# Patient Record
Sex: FEMALE | Race: WHITE | NOT HISPANIC OR LATINO | Employment: OTHER | URBAN - METROPOLITAN AREA
[De-identification: names, ages, dates, MRNs, and addresses within clinical notes are randomized per-mention and may not be internally consistent; named-entity substitution may affect disease eponyms.]

---

## 2017-06-07 ENCOUNTER — GENERIC CONVERSION - ENCOUNTER (OUTPATIENT)
Dept: OTHER | Facility: OTHER | Age: 67
End: 2017-06-07

## 2017-06-12 ENCOUNTER — GENERIC CONVERSION - ENCOUNTER (OUTPATIENT)
Dept: OTHER | Facility: OTHER | Age: 67
End: 2017-06-12

## 2017-08-17 ENCOUNTER — ALLSCRIPTS OFFICE VISIT (OUTPATIENT)
Dept: OTHER | Facility: OTHER | Age: 67
End: 2017-08-17

## 2017-08-17 DIAGNOSIS — R74.01 NONSPECIFIC ELEVATION OF LEVELS OF TRANSAMINASE AND LACTIC ACID DEHYDROGENASE (LDH): ICD-10-CM

## 2017-08-17 DIAGNOSIS — R74.02 NONSPECIFIC ELEVATION OF LEVELS OF TRANSAMINASE AND LACTIC ACID DEHYDROGENASE (LDH): ICD-10-CM

## 2017-08-17 DIAGNOSIS — R10.9 ABDOMINAL PAIN: ICD-10-CM

## 2017-08-18 ENCOUNTER — HOSPITAL ENCOUNTER (OUTPATIENT)
Dept: ULTRASOUND IMAGING | Facility: HOSPITAL | Age: 67
Discharge: HOME | End: 2017-08-18
Payer: COMMERCIAL

## 2017-08-18 ENCOUNTER — GENERIC CONVERSION - ENCOUNTER (OUTPATIENT)
Dept: OTHER | Facility: OTHER | Age: 67
End: 2017-08-18

## 2017-08-18 DIAGNOSIS — R74.01 NONSPECIFIC ELEVATION OF LEVELS OF TRANSAMINASE AND LACTIC ACID DEHYDROGENASE (LDH): ICD-10-CM

## 2017-08-18 DIAGNOSIS — R74.02 NONSPECIFIC ELEVATION OF LEVELS OF TRANSAMINASE AND LACTIC ACID DEHYDROGENASE (LDH): ICD-10-CM

## 2017-08-18 PROCEDURE — 76705 ECHO EXAM OF ABDOMEN: CPT

## 2017-08-19 LAB
ALT SERPL W P-5'-P-CCNC: 31 IU/L (ref 0–32)
AST SERPL W P-5'-P-CCNC: 30 IU/L (ref 0–40)
HEPATITIS B SURFACE ANTIBODY (HISTORICAL): 16.2 MIU/ML
HEPATITIS C ANTIBODY (HISTORICAL): <0.1 S/CO RATIO (ref 0–0.9)

## 2017-08-20 LAB — FERRITIN SERPL-MCNC: 145 NG/ML (ref 15–150)

## 2017-12-01 DIAGNOSIS — R92.8 OTHER ABNORMAL AND INCONCLUSIVE FINDINGS ON DIAGNOSTIC IMAGING OF BREAST: ICD-10-CM

## 2017-12-26 ENCOUNTER — GENERIC CONVERSION - ENCOUNTER (OUTPATIENT)
Dept: INTERNAL MEDICINE CLINIC | Facility: CLINIC | Age: 67
End: 2017-12-26

## 2018-01-11 NOTE — PROGRESS NOTES
Assessment    1  History of Oral Surgery Tooth Extraction   2  History of Neuroplasty Decompression Median Nerve At Carpal Tunnel   3  History of Cataract Surgery   4  Need for prophylactic vaccination and inoculation against varicella (V05 4) (Z23)   5  Encounter for preventive health examination (V70 0) (Z00 00)    Plan  Health Maintenance    · 3 - Hoang PINON, Torri Galvin  (Gastroenterology) Physician Referral  Consult  Status: Hold For -  Scheduling  Requested for: [id number]  are Referring to a non- Preferred Provider : Established Patient  Care Summary provided  : Yes  Hyperlipidemia    · Vytorin 10-20 MG Oral Tablet; TAKE 1 TABLET DAILY AS DIRECTED  Need for prophylactic vaccination and inoculation against varicella    · Zoster (Zostavax)    Discussion/Summary  health maintenance visit Currently, she eats an adequate diet and has an inadequate exercise regimen  the risks and benefits of cervical cancer screening were discussed cervical cancer screening is managed by [facility] Breast cancer screening: the risks and benefits of breast cancer screening were discussed  Colorectal cancer screening: the risks and benefits of colorectal cancer screening were discussed and Referred back to Dr Danica Sidhu  The risks and benefits of immunizations were discussed and immunizations are needed  Advice and education were given regarding nutrition and aerobic exercise  Patient discussion: discussed with the patient  Ramana Rodriguez was seen and examined in the office today  Examination reveals +S1/S2, regular rate and rhythm without murmurs or gallops  Pulmonary and abdominal examination are unremarkable  She does have a noted corn of the foot and will be seeing [facility] on Friday  She does have to schedule a routine follow up with her Ob-gyn as it has been some time  She was referred to her previous gastroenterologist as she is overdue for her Cscope     In terms of her hyperlipidemia, she will remain on the Vytorin and does have blood work to repeat in a few weeks time  She was given Zostavax today and we discussed Prevnar to be given at the next visit  Otherwise no other changes were made  The patient was counseled regarding instructions for management  Chief Complaint  Annual physical      History of Present Illness  HM, Adult Female: The patient is being seen for a health maintenance evaluation  The last health maintenance visit was 2 year(s) ago  Social History: Household members include spouse  Work status: working full time and occupation: Labarotary technician  The patient has never smoked cigarettes  She reports rare alcohol use  She has never used illicit drugs  General Health: The patient's health since the last visit is described as fair  She has regular dental visits  She denies vision problems  She denies hearing loss  Immunizations status: not up to date  Lifestyle:  She consumes a diverse and healthy diet  She has weight concerns  She does not exercise regularly  She does not use tobacco  She consumes alcohol  She denies drug use  Reproductive health: the patient is postmenopausal    Screening:   HPI: Salome Resendiz comes to the office today for a routine physical  She reports generally feeling well and denies any significant complaints  Recently, she was diagnosed with hyperlipidemia after completing blood work at work and was started on Simvastatin  She reported not feeling well on this and a physician at her work switched her to [employer]  She reports tolerating this  She otherwise denies any other complaints  Review of Systems    Constitutional: no fever, not feeling poorly, no recent weight gain, no chills, not feeling tired and no recent weight loss  Eyes: no eye pain, no eyesight problems, no dryness of the eyes, eyes not red, no purulent discharge from the eyes and no itching of the eyes  ENT: no earache, no sore throat, no nasal discharge and no hoarseness     Cardiovascular: palpitations, but no chest pain, no intermittent leg claudication and the heart rate was not fast    Respiratory: no shortness of breath, no cough, no orthopnea, no shortness of breath during exertion and no PND  Gastrointestinal: no abdominal pain, no nausea, no vomiting, no constipation, no diarrhea and no blood in stools  Genitourinary: no dysuria, no pelvic pain and no unexplained vaginal bleeding  Musculoskeletal: no arthralgias, no joint swelling, no myalgias and no joint stiffness  Integumentary: no rashes, no itching, no breast pain, no skin lesions and no breast lump  Neurological: no headache, no numbness, no tingling and no dizziness  Psychiatric: not suicidal, no anxiety, no sleep disturbances and no depression  Endocrine: no hot flashes  Hematologic/Lymphatic: no swollen glands, no tendency for easy bleeding and no tendency for easy bruising  ROS reviewed  Active Problems    1  Dependent edema (782 3) (R60 9)   2  Hyperlipidemia (272 4) (E78 5)   3  Radial tunnel syndrome (354 3) (G56 30)   4  Venous insufficiency (459 81) (I87 2)    Past Medical History    · History of Post-viral reactive airway disease (493 10) (J45 998)    Surgical History    · History of Cataract Surgery   · History of Neuroplasty Decompression Median Nerve At Carpal Tunnel   · History of Oral Surgery Tooth Extraction    Family History    · Family history of Diabetes Mellitus (V18 0)    Social History    · Never a smoker    Current Meds   1  Aspirin 81 MG Oral Tablet; Therapy: 28Jhs5149 to Recorded   2  Fish Oil Maximum Strength 1200 MG Oral Capsule Delayed Release; TAKE 2 CAPSULE   Daily; Therapy: 18VNO9211 to (Evaluate:14Jan2015); Last Rx:[id number] Ordered   3  Multi-Vitamins Oral Tablet; TAKE 1 TABLET DAILY; Therapy: 76UPH4054 to (Evaluate:34Mhv5480); Last Rx:[id number] Ordered   4  Vitamin B-12 ER 1000 MCG Oral Tablet Extended Release; TAKE 1 TABLET DAILY AS   DIRECTED; Therapy: 94AOL2037 to (Evaluate:17Aug2014);  Last Rx: [id number] Ordered   5  Vitamin D3 2000 UNIT Oral Capsule; take 1 capsule daily; Therapy: 22TID3916 to (Last Rx:[id number]) Ordered   6  Vitamin E 100 UNIT Oral Capsule; Therapy: 48Quo7525 to Recorded   7  Vytorin 10-20 MG Oral Tablet; TAKE 1 TABLET DAILY AS DIRECTED; Therapy: 21Mar2016 to Recorded    Allergies    1  Neosporin OINT    Vitals   Recorded: 21Mar2016 02:32PM   Heart Rate 70   Systolic 922   Diastolic 60   Height 5 ft 3 in   Weight 178 lb 6 08 oz   BMI Calculated 31 6   BSA Calculated 1 85   O2 Saturation 96     Physical Exam    Constitutional   General appearance: No acute distress, well appearing and well nourished  Head and Face   Head and face: Normal     Palpation of the face and sinuses: No sinus tenderness  Eyes   Conjunctiva and lids: No swelling, erythema or discharge  Pupils and irises: Equal, round, reactive to light  Ears, Nose, Mouth, and Throat   External inspection of ears and nose: Normal     Otoscopic examination: Abnormal   Cerumen present bilaterally  Nasal mucosa, septum, and turbinates: Normal without edema or erythema  Lips, teeth, and gums: Normal, good dentition  Oropharynx: Normal with no erythema, edema, exudate or lesions  Neck   Neck: Supple, symmetric, trachea midline, no masses  Thyroid: Normal, no thyromegaly  Pulmonary   Respiratory effort: No increased work of breathing or signs of respiratory distress  Auscultation of lungs: Clear to auscultation  Cardiovascular   Palpation of heart: Normal PMI, no thrills  Auscultation of heart: Normal rate and rhythm, normal S1 and S2, no murmurs  Carotid pulses: 2+ bilaterally  Pedal pulses: 2+ bilaterally  Examination of extremities for edema and/or varicosities: Normal     Abdomen   Abdomen: Non-tender, no masses  Liver and spleen: No hepatomegaly or splenomegaly  Examination for hernias: No hernia appreciated      Lymphatic   Palpation of lymph nodes in neck: No lymphadenopathy  Musculoskeletal   Gait and station: Normal     Digits and nails: Normal without clubbing or cyanosis  Joints, bones, and muscles: Normal     Range of motion: Normal     Stability: Normal     Muscle strength/tone: Normal     Skin   Skin and subcutaneous tissue: Normal without rashes or lesions  Corn noted on left foot  Neurologic   Cranial nerves: Cranial nerves II-XII intact  Cortical function: Normal mental status  Sensation: No sensory loss  Psychiatric   Judgment and insight: Normal     Orientation to person, place, and time: Normal     Recent and remote memory: Intact      Mood and affect: Normal        Signatures   Electronically signed by : Mikala Carr DO; Mar 21 2016  4:57PM EST                       (Author)

## 2018-01-13 VITALS
HEIGHT: 63 IN | DIASTOLIC BLOOD PRESSURE: 76 MMHG | BODY MASS INDEX: 28.17 KG/M2 | SYSTOLIC BLOOD PRESSURE: 130 MMHG | WEIGHT: 159 LBS

## 2018-05-25 ENCOUNTER — OFFICE VISIT (OUTPATIENT)
Dept: FAMILY MEDICINE CLINIC | Facility: CLINIC | Age: 68
End: 2018-05-25
Payer: COMMERCIAL

## 2018-05-25 VITALS
DIASTOLIC BLOOD PRESSURE: 70 MMHG | SYSTOLIC BLOOD PRESSURE: 116 MMHG | TEMPERATURE: 97.8 F | OXYGEN SATURATION: 97 % | HEART RATE: 64 BPM

## 2018-05-25 DIAGNOSIS — R68.89 FLU-LIKE SYMPTOMS: Primary | ICD-10-CM

## 2018-05-25 DIAGNOSIS — E66.9 OBESITY, UNSPECIFIED CLASSIFICATION, UNSPECIFIED OBESITY TYPE, UNSPECIFIED WHETHER SERIOUS COMORBIDITY PRESENT: ICD-10-CM

## 2018-05-25 PROBLEM — R74.01 ELEVATED AST (SGOT): Status: ACTIVE | Noted: 2017-08-17

## 2018-05-25 PROBLEM — R74.01 ELEVATED ALT MEASUREMENT: Status: ACTIVE | Noted: 2017-08-17

## 2018-05-25 PROBLEM — R92.8 ABNORMAL FINDINGS ON DIAGNOSTIC IMAGING OF BREAST: Status: ACTIVE | Noted: 2017-06-15

## 2018-05-25 PROBLEM — R10.9 ABDOMINAL PAIN: Status: ACTIVE | Noted: 2017-08-17

## 2018-05-25 PROCEDURE — 99213 OFFICE O/P EST LOW 20 MIN: CPT | Performed by: PHYSICIAN ASSISTANT

## 2018-05-25 RX ORDER — BENZONATATE 200 MG/1
200 CAPSULE ORAL 3 TIMES DAILY PRN
COMMUNITY
End: 2018-11-08 | Stop reason: ALTCHOICE

## 2018-05-25 RX ORDER — AZITHROMYCIN 250 MG/1
500 TABLET, FILM COATED ORAL EVERY 24 HOURS
COMMUNITY
End: 2018-11-08 | Stop reason: ALTCHOICE

## 2018-05-25 RX ORDER — FOLIC ACID/MULTIVIT,IRON,MINER 0.4MG-18MG
2 TABLET ORAL DAILY
COMMUNITY
Start: 2014-07-18

## 2018-05-25 RX ORDER — PROMETHAZINE HYDROCHLORIDE AND CODEINE PHOSPHATE 6.25; 1 MG/5ML; MG/5ML
5 SYRUP ORAL EVERY 4 HOURS PRN
Qty: 120 ML | Refills: 0 | Status: SHIPPED | OUTPATIENT
Start: 2018-05-25 | End: 2018-11-08 | Stop reason: ALTCHOICE

## 2018-05-25 RX ORDER — EZETIMIBE AND SIMVASTATIN 10; 40 MG/1; MG/1
TABLET ORAL DAILY
COMMUNITY
Start: 2018-03-29 | End: 2019-12-20 | Stop reason: SDUPTHER

## 2018-05-25 NOTE — ASSESSMENT & PLAN NOTE
The patient wished to have assistance with her weight  She refused to have her weight checked today but was interested in following up with weight management  The patient was referred to weight management (nonsurgical) as above

## 2018-05-25 NOTE — LETTER
May 25, 2018     Patient: Jerry Bowman   YOB: 1950   Date of Visit: 5/25/2018       To Whom it May Concern:    Jerry Bowman is under my professional care  She was seen in my office on 5/25/2018  She may return to work on 5/29/18  Please excuse her absence from 5/22/18 through 5/25/18  If you have any questions or concerns, please don't hesitate to call           Sincerely,          Ellen Miguel PA-C        CC: No Recipients

## 2018-05-25 NOTE — PROGRESS NOTES
[facility]  FAMILY PRACTICE ACUTE OFFICE VISIT       NAME: Radha Fox  AGE: 79 y o  SEX: female       : 1950        MRN: [id number]    DATE: 2018  TIME: 12:22 PM    Assessment and Plan     Problem List Items Addressed This Visit     Obesity       The patient wished to have assistance with her weight  She refused to have her weight checked today but was interested in following up with weight management  The patient was referred to weight management (nonsurgical) as above  Relevant Orders    Ambulatory referral to Weight Management      Other Visit Diagnoses     Flu-like symptoms    -  Primary    Relevant Medications    promethazine-codeine (PHENERGAN WITH CODEINE) 6 25-10 mg/5 mL syrup          Obesity    The patient wished to have assistance with her weight  She refused to have her weight checked today but was interested in following up with weight management  The patient was referred to weight management (nonsurgical) as above  Patient Instructions     I reviewed with the patient that her symptoms appeared consistent with the flu  She has had her symptoms for the last 4-5 days and she does seem to be starting to improve  She is not getting adequate relief from the benzonatate that she already has so she was given a script for promethazine with codeine cough syrup to use as needed  She was directed not to take this prior to driving or any other activities where she should not be drowsy  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  She reports that she plans to finish taking it though  She was encouraged to call for symptoms worsen or fail to improve; however, she was reassured that she should be better in time to return to work next Tuesday    She was given a work note to excuse her absence this week  Chief Complaint     Chief Complaint   Patient presents with    URI     coughing and sore thorat for 5 days        History of Present Illness   Dany Grijalva is a 79y o -year-old female who presents for ongoing cough  Cough   This is a new (improving except for cough and voice change - did have flu shot this year) problem  Episode onset: about 4 days ago  The cough is non-productive  Associated symptoms include chills, myalgias (improving - helped by ibuprofen 800 mg ), rhinorrhea (blowing nose more than normal  ) and sweats  Pertinent negatives include no ear pain, fever, headaches, postnasal drip, sore throat, shortness of breath or wheezing  Nothing aggravates the symptoms  Risk factors: works in [employer]  Treatments tried: Dagoberto Louder and [address]  The treatment provided no relief  Her past medical history is significant for environmental allergies (notes it is usually in April)  There is no history of asthma, bronchitis, COPD or pneumonia  Review of Systems   Review of Systems   Constitutional: Positive for chills and fatigue  Negative for fever  HENT: Positive for rhinorrhea (blowing nose more than normal  ), sneezing and voice change  Negative for congestion, ear pain, postnasal drip, sinus pressure and sore throat  Respiratory: Positive for cough (dry)  Negative for chest tightness, shortness of breath and wheezing  Gastrointestinal: Negative for diarrhea, nausea and vomiting  Musculoskeletal: Positive for myalgias (improving - helped by ibuprofen 800 mg )  Allergic/Immunologic: Positive for environmental allergies (notes it is usually in April)  Neurological: Negative for dizziness and headaches         Active Problem List     Patient Active Problem List   Diagnosis    Abdominal pain    Abnormal findings on diagnostic imaging of breast    Dependent edema    Elevated ALT measurement    Elevated AST (SGOT)    Hyperlipidemia    Radial tunnel syndrome    Venous insufficiency    Obesity         Social History:  Social History     Social History    Marital status: /Civil Union     Spouse name: N/A    Number of children: N/A    Years of education: N/A     Occupational History    Not on file  Social History Main Topics    Smoking status: Never Smoker    Smokeless tobacco: Never Used    Alcohol use Yes      Comment: rarely     Drug use: No    Sexual activity: Not on file     Other Topics Concern    Not on file     Social History Narrative    No narrative on file       Objective     Vitals:    05/25/18 1056   BP: 116/70   Pulse: 64   Temp: 97 8 °F (36 6 °C)   SpO2: 97%     Wt Readings from Last 3 Encounters:   08/17/17 72 1 kg (159 lb)   07/11/16 72 1 kg (159 lb)   03/21/16 80 9 kg (178 lb 6 1 oz)       Physical Exam   Constitutional: She appears well-developed and well-nourished  No distress  HENT:   Head: Normocephalic and atraumatic  Right Ear: External ear normal    Left Ear: External ear normal    Nose: Nose normal    Mouth/Throat: Oropharynx is clear and moist    Neck: Neck supple  No thyromegaly present  Cardiovascular: Normal rate, regular rhythm, normal heart sounds and intact distal pulses  No murmur heard  Pulmonary/Chest: Effort normal and breath sounds normal  She has no wheezes  She has no rales  Lymphadenopathy:     She has no cervical adenopathy  Skin: Skin is warm and dry  Psychiatric: She has a normal mood and affect  Her behavior is normal    Vitals reviewed          ALLERGIES:  Allergies   Allergen Reactions    Other Hives     Apples, pears, peaches     Pollen Extract     Neomycin-Bacitracin Zn-Polymyx Rash       Current Medications     Current Outpatient Prescriptions   Medication Sig Dispense Refill    aspirin 81 MG tablet Take 81 mg by mouth daily        azithromycin (ZITHROMAX) 250 mg tablet Take 500 mg by mouth every 24 hours      benzonatate (TESSALON) 200 MG capsule Take 200 mg by mouth 3 (three) times a day as needed for cough      Cholecalciferol (EQL VITAMIN D3) 2000 units CAPS Take 1 capsule by mouth daily      Cyanocobalamin ER (TH VITAMIN B12 ER) 1000 MCG TBCR Take 1 tablet by mouth daily      ezetimibe-simvastatin (VYTORIN) 10-40 mg per tablet Take by mouth daily      Multiple Vitamin (MULTI-VITAMINS PO) Take 1 tablet by mouth daily      Omega-3 Fatty Acids (FISH OIL MAXIMUM STRENGTH) 1200 MG CPDR Take 2 capsules by mouth daily      vitamin E 100 UNIT capsule Take 100 Units by mouth daily        promethazine-codeine (PHENERGAN WITH CODEINE) 6 25-10 mg/5 mL syrup Take 5 mL by mouth every 4 (four) hours as needed for cough 120 mL 0     No current facility-administered medications for this visit            Delaney Lopez PA-C  5/25/2018 12:22 PM  [facility]

## 2018-05-25 NOTE — PATIENT INSTRUCTIONS
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

## 2018-06-01 DIAGNOSIS — R92.8 OTHER ABNORMAL AND INCONCLUSIVE FINDINGS ON DIAGNOSTIC IMAGING OF BREAST: ICD-10-CM

## 2018-08-09 ENCOUNTER — TELEPHONE (OUTPATIENT)
Dept: INTERNAL MEDICINE CLINIC | Facility: CLINIC | Age: 68
End: 2018-08-09

## 2018-08-09 DIAGNOSIS — N64.4 BREAST PAIN: Primary | ICD-10-CM

## 2018-08-09 NOTE — TELEPHONE ENCOUNTER
Pt called and needs a mammogram script faxed to [facility]  She is having breast pain  Her mammogram is scheduled for this Monday 8/13  Please fax this to [phone]

## 2018-11-06 ENCOUNTER — TELEPHONE (OUTPATIENT)
Dept: INTERNAL MEDICINE CLINIC | Facility: CLINIC | Age: 68
End: 2018-11-06

## 2018-11-06 NOTE — TELEPHONE ENCOUNTER
Waiting until 11/8 is very reasonable   These readings are not in the range for a hypertensive urgency and may be reactive and normal

## 2018-11-06 NOTE — TELEPHONE ENCOUNTER
PT BP is elevated 143/90 and 150/86 for past 12 hrs  I suggested dr Jenny Reeder or Urgent care which she declined  We also made an appt on 11/8 with you  Do you have any advise until that time

## 2018-11-08 ENCOUNTER — OFFICE VISIT (OUTPATIENT)
Dept: INTERNAL MEDICINE CLINIC | Facility: CLINIC | Age: 68
End: 2018-11-08
Payer: COMMERCIAL

## 2018-11-08 VITALS
HEIGHT: 63 IN | SYSTOLIC BLOOD PRESSURE: 142 MMHG | HEART RATE: 73 BPM | DIASTOLIC BLOOD PRESSURE: 78 MMHG | OXYGEN SATURATION: 89 % | WEIGHT: 175.6 LBS | BODY MASS INDEX: 31.11 KG/M2 | TEMPERATURE: 97.7 F

## 2018-11-08 DIAGNOSIS — D05.12 DUCTAL CARCINOMA IN SITU (DCIS) OF LEFT BREAST: ICD-10-CM

## 2018-11-08 DIAGNOSIS — R03.0 BLOOD PRESSURE ELEVATED WITHOUT HISTORY OF HTN: Primary | ICD-10-CM

## 2018-11-08 PROBLEM — R10.9 ABDOMINAL PAIN: Status: RESOLVED | Noted: 2017-08-17 | Resolved: 2018-11-08

## 2018-11-08 PROBLEM — R92.8 ABNORMAL FINDINGS ON DIAGNOSTIC IMAGING OF BREAST: Status: RESOLVED | Noted: 2017-06-15 | Resolved: 2018-11-08

## 2018-11-08 PROBLEM — R74.01 ELEVATED AST (SGOT): Status: RESOLVED | Noted: 2017-08-17 | Resolved: 2018-11-08

## 2018-11-08 PROBLEM — R74.01 ELEVATED ALT MEASUREMENT: Status: RESOLVED | Noted: 2017-08-17 | Resolved: 2018-11-08

## 2018-11-08 PROCEDURE — 99214 OFFICE O/P EST MOD 30 MIN: CPT | Performed by: INTERNAL MEDICINE

## 2018-11-08 PROCEDURE — 3008F BODY MASS INDEX DOCD: CPT | Performed by: INTERNAL MEDICINE

## 2018-11-08 RX ORDER — NICOTINE POLACRILEX 2 MG
GUM BUCCAL
COMMUNITY

## 2018-11-08 NOTE — PROGRESS NOTES
Assessment/Plan   Problem List Items Addressed This Visit     Ductal carcinoma in situ (DCIS) of left breast      Other Visit Diagnoses     Blood pressure elevated without history of HTN    -  Primary      Elevated blood pressure:  Repeat blood pressure was 128/74, after sitting and having counseling for 20 minutes  Plan is to not check blood pressures at home as this seems to be causing anticipatory elevated blood pressure readings  We discussed the likelihood that she does not have hypertension instead has reactive elevation of blood pressure due to situational stress and take her blood pressure too often  Reassess in 2 weeks at next visit  Ductal carcinoma in situ with a recent transfer of care to [facility], after October 1st left partial mastectomy, subsequent second revision, with active staging occurring at [facility], for follow-up on November 15th with both Medical Oncology and Radiation Oncology  Joe Salmon is quite emotional discussing all this was quite understandable  She does have good support from her family and good plan going forward is very knowledgeable as 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   We discussed the likelihood that she will need post radiation therapy and chemotherapy, and she is prepared to start these treatments  Subjective   Patient ID: Marck Anglin is a 76 y o  female  She made appointment today to discuss her recent diagnosis of breast cancer, and I did have available all the records from both [facility] and [facility]  We also discussed that her blood pressures have been elevated when she has checked them recently  Vitals:    11/08/18 1013   BP: 142/78   Pulse: 73   Temp: 97 7 °F (36 5 °C)   SpO2: (!) 89%     HPI   Joe Salmon does not have a history of hypertension and her September 55PI basic metabolic profile was normal including GFR 61, glucose 104, calcium 9 11    Joe Salmon has been under lot of stress recently with recent diagnosis of breast cancer  Initial diagnosis is made after abnormal mammogram in August, in follow-up to 2 previous mammograms in the last year that were suspicious  Subsequent biopsy showed ductal carcinoma in situ with October 1st surgery showing cancer to margins and then repeat surgery, with local lymph node dissection performed at [facility], showing no lymph node involvement  She will be seeing medical Oncology and Radiation Oncology next week in follow-up at [facility] which is where she has chosen to continue her treatment  She has little or no pain after her surgeries, and her main issue is her concerned about her diagnosis and prognosis  She had a good discussion with her medical oncologist at [facility] understanding that planned treatment should be curative  Understandably, she is very emotional we talked about how she is coping with this today  Her blood pressures have been up at times at office visits in Oncology and she is taking her blood pressures at home and does have a medical background she works as a technician in the pathology office  Blood pressures have been high, without headache, chest pain or shortness of breath or history of hypertension  Reviewed her September 12th normal basic metabolic profile  At that same time, CBC was normal     Also reviewed with the notes from her abnormal mammogram, biopsy results, surgery of October 1st and subsequent procedures and follow-up at [facility] after transferring her care  The following portions of the patient's history were reviewed and updated as appropriate: allergies, current medications, past family history, past medical history, past social history, past surgical history and problem list     Review of Systems    Objective   Physical Exam   Vital signs stable, tearful discussing her situation but did feel better after discussion today    Repeat blood pressure was normal at 128/74, right arm sitting  Lungs clear cardiac exam is normal   She showed me her surgical sites which are healing well          Patient Active Problem List   Diagnosis    Dependent edema    Radial tunnel syndrome    Venous insufficiency    Obesity    Ductal carcinoma in situ (DCIS) of left breast     Current Outpatient Prescriptions:     aspirin 81 MG tablet, Take 81 mg by mouth daily  , Disp: , Rfl:     Biotin 1 MG CAPS, Take by mouth, Disp: , Rfl:     Cholecalciferol (EQL VITAMIN D3) 2000 units CAPS, Take 1 capsule by mouth daily, Disp: , Rfl:     Cyanocobalamin ER (TH VITAMIN B12 ER) 1000 MCG TBCR, Take 1 tablet by mouth daily, Disp: , Rfl:     ezetimibe 10 mg TABS 10 mg, atorvastatin 40 mg TABS 40 mg, Take by mouth daily, Disp: , Rfl:     ezetimibe-simvastatin (VYTORIN) 10-40 mg per tablet, Take by mouth daily, Disp: , Rfl:     Multiple Vitamin (MULTI-VITAMINS PO), Take 1 tablet by mouth daily, Disp: , Rfl:     Omega-3 Fatty Acids (FISH OIL MAXIMUM STRENGTH) 1200 MG CPDR, Take 2 capsules by mouth daily, Disp: , Rfl:     vitamin E 100 UNIT capsule, Take 100 Units by mouth daily  , Disp: , Rfl:

## 2018-11-19 ENCOUNTER — OFFICE VISIT (OUTPATIENT)
Dept: INTERNAL MEDICINE CLINIC | Facility: CLINIC | Age: 68
End: 2018-11-19
Payer: COMMERCIAL

## 2018-11-19 VITALS
HEART RATE: 79 BPM | HEIGHT: 63 IN | SYSTOLIC BLOOD PRESSURE: 124 MMHG | DIASTOLIC BLOOD PRESSURE: 74 MMHG | OXYGEN SATURATION: 99 % | BODY MASS INDEX: 31.11 KG/M2 | TEMPERATURE: 98.5 F

## 2018-11-19 DIAGNOSIS — F43.9 SITUATIONAL STRESS: Primary | ICD-10-CM

## 2018-11-19 DIAGNOSIS — D05.12 DUCTAL CARCINOMA IN SITU (DCIS) OF LEFT BREAST: ICD-10-CM

## 2018-11-19 PROBLEM — M47.817 LUMBOSACRAL SPONDYLOSIS WITHOUT MYELOPATHY: Status: ACTIVE | Noted: 2018-11-19

## 2018-11-19 PROCEDURE — 1160F RVW MEDS BY RX/DR IN RCRD: CPT | Performed by: INTERNAL MEDICINE

## 2018-11-19 PROCEDURE — 1036F TOBACCO NON-USER: CPT | Performed by: INTERNAL MEDICINE

## 2018-11-19 PROCEDURE — 99213 OFFICE O/P EST LOW 20 MIN: CPT | Performed by: INTERNAL MEDICINE

## 2018-11-19 NOTE — PROGRESS NOTES
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   She will have her port placed the same day the chemotherapy starts, which will be 1 week from today, at a satellite campus of [facility], located in [facility]  Blood pressure is now normal   Social supports are excellent and her attitude is excellent  We discussed I will follow her progress actively through communication I received from [facility]  We also discussed that for any general medical issues, or issues I can help with locally, I will be available  Subjective   Patient ID: Kavita Dixon is a 76 y o  female, here in follow-up regarding situational stress and elevation of blood pressure last week  Vitals:    11/19/18 1517   BP: 124/74   Pulse:    Temp:    SpO2:      HPI   Tara Mayorga is doing much better today as she has gotten a lot of great support from [facility] where she will be receiving her chemotherapy treatment starting next week  Also, her family, friends, coworkers have been very supportive  She very good idea what to expect with her treatment and should do very well  She sleeping better, much less anxious  We discussed her upcoming chemotherapy protocol, and expected side effects and benefits of treatment  She had her 2D echocardiogram today for surveillance prior to chemotherapy and results are pending      The following portions of the patient's history were reviewed and updated as appropriate: allergies, current medications, past family history, past medical history, past social history, past surgical history and problem list     Review of Systems    Objective   Physical Exam   Vital signs stable, pulse regular and repeat blood pressure is 124/74 right arm sitting  Anxiety present last visit has resolved  Attitude is excellent            Patient Active Problem List   Diagnosis    Dependent edema    Radial tunnel syndrome    Venous insufficiency    Obesity    Ductal carcinoma in situ (DCIS) of left breast    Lumbosacral spondylosis without myelopathy     Current Outpatient Prescriptions:     aspirin 81 MG tablet, Take 81 mg by mouth daily  , Disp: , Rfl:     Biotin 1 MG CAPS, Take by mouth, Disp: , Rfl:     Cholecalciferol (EQL VITAMIN D3) 2000 units CAPS, Take 1 capsule by mouth daily, Disp: , Rfl:     Cyanocobalamin ER (TH VITAMIN B12 ER) 1000 MCG TBCR, Take 1 tablet by mouth daily, Disp: , Rfl:     ezetimibe 10 mg TABS 10 mg, atorvastatin 40 mg TABS 40 mg, Take by mouth daily, Disp: , Rfl:     ezetimibe-simvastatin (VYTORIN) 10-40 mg per tablet, Take by mouth daily, Disp: , Rfl:     Multiple Vitamin (MULTI-VITAMINS PO), Take 1 tablet by mouth daily, Disp: , Rfl:     Omega-3 Fatty Acids (FISH OIL MAXIMUM STRENGTH) 1200 MG CPDR, Take 2 capsules by mouth daily, Disp: , Rfl:     vitamin E 100 UNIT capsule, Take 100 Units by mouth daily  , Disp: , Rfl:

## 2019-01-17 ENCOUNTER — TELEPHONE (OUTPATIENT)
Dept: INTERNAL MEDICINE CLINIC | Facility: CLINIC | Age: 69
End: 2019-01-17

## 2019-01-17 ENCOUNTER — OFFICE VISIT (OUTPATIENT)
Dept: INTERNAL MEDICINE CLINIC | Facility: CLINIC | Age: 69
End: 2019-01-17
Payer: COMMERCIAL

## 2019-01-17 VITALS
TEMPERATURE: 99.8 F | OXYGEN SATURATION: 98 % | WEIGHT: 167 LBS | SYSTOLIC BLOOD PRESSURE: 120 MMHG | HEART RATE: 120 BPM | DIASTOLIC BLOOD PRESSURE: 70 MMHG | BODY MASS INDEX: 29.59 KG/M2 | HEIGHT: 63 IN

## 2019-01-17 DIAGNOSIS — J02.9 ST (SORE THROAT): Primary | ICD-10-CM

## 2019-01-17 LAB — S PYO AG THROAT QL: POSITIVE

## 2019-01-17 PROCEDURE — 1036F TOBACCO NON-USER: CPT | Performed by: INTERNAL MEDICINE

## 2019-01-17 PROCEDURE — 99213 OFFICE O/P EST LOW 20 MIN: CPT | Performed by: INTERNAL MEDICINE

## 2019-01-17 PROCEDURE — 1160F RVW MEDS BY RX/DR IN RCRD: CPT | Performed by: INTERNAL MEDICINE

## 2019-01-17 PROCEDURE — 87880 STREP A ASSAY W/OPTIC: CPT | Performed by: INTERNAL MEDICINE

## 2019-01-17 PROCEDURE — 3008F BODY MASS INDEX DOCD: CPT | Performed by: INTERNAL MEDICINE

## 2019-01-17 RX ORDER — AZITHROMYCIN 500 MG/1
500 TABLET, FILM COATED ORAL EVERY 24 HOURS
Qty: 4 TABLET | Refills: 1 | Status: SHIPPED | OUTPATIENT
Start: 2019-01-17 | End: 2019-01-21

## 2019-01-17 NOTE — PROGRESS NOTES
Assessment/Plan:    No problem-specific Assessment & Plan notes found for this encounter  Diagnoses and all orders for this visit:    ST (sore throat)          Subjective:      Patient ID: Yenni López is a 76 y o  female  DAWSON Fox i she has significant throat pain s here today with a sore throat that she has had for couple of days since I saw her the last time she has developed breast cancer under 1 surgical procedure will be facing radiation and is initiated chemotherapy this has been done to the auspices of [facility] in [place]  She is got through her surgery and is proceeding with the chemotherapy with great courage she has significant throat pain that is aggravated by swallowing she has she is aware that she has a low-grade fever but does not feel septic or toxic    The following portions of the patient's history were reviewed and updated as appropriate: allergies, current medications, past family history, past social history, past surgical history and problem list     Review of Systems      Objective:      /70   Pulse (!) 120   Temp 99 8 °F (37 7 °C) (Tympanic)   Ht 5' 3" (1 6 m)   Wt 75 8 kg (167 lb)   SpO2 98%   BMI 29 58 kg/m²          Physical Exam  she appears well in no distress she has significant alopecia and is wearing and Azell Shiva had her blood pressure is 120/70 the pulses in the 70s and is quite unremarkable tympanic pressure was 99 8   a the left ty  She calls if there are any problems  mpanic canal is largely occluded with cerumen the visible part of the drum is unremarkable other right tympanic membrane is normal the pharynx actually looks quite benign it is is a little bit of erythema there is no pus not much in the way of air induration adenopathy is not noted  A rapid strep is negative    I believe she most probably has a viral URI will give her prescription for some Zithromax 500 mg in case a after day or to the symptoms do not resolve taking into consideration that the rapid strep test is not 100% reliable she will let the her physicians at [facility] know about this visit

## 2019-02-07 DIAGNOSIS — M79.604 BILATERAL LEG PAIN: Primary | ICD-10-CM

## 2019-02-07 DIAGNOSIS — M79.605 BILATERAL LEG PAIN: Primary | ICD-10-CM

## 2019-02-07 NOTE — PROGRESS NOTES
Pt was advised to get a b/l venous doppler due to leg pain  She is currently having problems sleeping as well  Can she have something to help her sleep, she has tried otc remedies

## 2019-02-08 ENCOUNTER — HOSPITAL ENCOUNTER (OUTPATIENT)
Dept: NON INVASIVE DIAGNOSTICS | Facility: CLINIC | Age: 69
Discharge: HOME | End: 2019-02-08
Payer: COMMERCIAL

## 2019-02-08 DIAGNOSIS — M79.604 BILATERAL LEG PAIN: ICD-10-CM

## 2019-02-08 DIAGNOSIS — M79.605 BILATERAL LEG PAIN: ICD-10-CM

## 2019-02-08 PROCEDURE — 93970 EXTREMITY STUDY: CPT

## 2019-02-08 PROCEDURE — 93970 EXTREMITY STUDY: CPT | Performed by: SURGERY

## 2019-12-19 ENCOUNTER — ANESTHESIA EVENT (OUTPATIENT)
Dept: GASTROENTEROLOGY | Facility: HOSPITAL | Age: 69
End: 2019-12-19

## 2019-12-20 ENCOUNTER — ANESTHESIA (OUTPATIENT)
Dept: GASTROENTEROLOGY | Facility: HOSPITAL | Age: 69
End: 2019-12-20

## 2019-12-20 ENCOUNTER — HOSPITAL ENCOUNTER (OUTPATIENT)
Dept: GASTROENTEROLOGY | Facility: HOSPITAL | Age: 69
Setting detail: OUTPATIENT SURGERY
Discharge: HOME | End: 2019-12-20
Attending: INTERNAL MEDICINE | Admitting: INTERNAL MEDICINE
Payer: COMMERCIAL

## 2019-12-20 VITALS
TEMPERATURE: 97.5 F | OXYGEN SATURATION: 99 % | SYSTOLIC BLOOD PRESSURE: 106 MMHG | HEART RATE: 77 BPM | RESPIRATION RATE: 16 BRPM | BODY MASS INDEX: 29.59 KG/M2 | DIASTOLIC BLOOD PRESSURE: 58 MMHG | WEIGHT: 167 LBS | HEIGHT: 63 IN

## 2019-12-20 DIAGNOSIS — Z12.11 ENCOUNTER FOR SCREENING FOR MALIGNANT NEOPLASM OF COLON: ICD-10-CM

## 2019-12-20 DIAGNOSIS — Z85.3 PERSONAL HISTORY OF MALIGNANT NEOPLASM OF BREAST: ICD-10-CM

## 2019-12-20 RX ORDER — PROPOFOL 10 MG/ML
INJECTION, EMULSION INTRAVENOUS AS NEEDED
Status: DISCONTINUED | OUTPATIENT
Start: 2019-12-20 | End: 2019-12-20 | Stop reason: SURG

## 2019-12-20 RX ORDER — SODIUM CHLORIDE 9 MG/ML
125 INJECTION, SOLUTION INTRAVENOUS CONTINUOUS
Status: DISCONTINUED | OUTPATIENT
Start: 2019-12-20 | End: 2019-12-24 | Stop reason: HOSPADM

## 2019-12-20 RX ADMIN — PROPOFOL 40 MG: 10 INJECTION, EMULSION INTRAVENOUS at 10:56

## 2019-12-20 RX ADMIN — SODIUM CHLORIDE 125 ML/HR: 0.9 INJECTION, SOLUTION INTRAVENOUS at 09:56

## 2019-12-20 RX ADMIN — LIDOCAINE HYDROCHLORIDE 50 MG: 20 INJECTION, SOLUTION INTRAVENOUS at 10:48

## 2019-12-20 RX ADMIN — PROPOFOL 50 MG: 10 INJECTION, EMULSION INTRAVENOUS at 10:52

## 2019-12-20 RX ADMIN — PROPOFOL 150 MG: 10 INJECTION, EMULSION INTRAVENOUS at 10:48

## 2019-12-20 RX ADMIN — PROPOFOL 30 MG: 10 INJECTION, EMULSION INTRAVENOUS at 10:58

## 2019-12-20 NOTE — ANESTHESIA PREPROCEDURE EVALUATION
Review of Systems/Medical History          Cardiovascular  Hyperlipidemia,    Pulmonary  Negative pulmonary ROS        GI/Hepatic  Negative GI/hepatic ROS               Endo/Other  Negative endo/other ROS      GYN    Breast cancer axillary node dissection and left lumpectomy       Hematology  Negative hematology ROS      Musculoskeletal       Neurology  Negative neurology ROS      Psychology           Physical Exam    Airway    Mallampati score: I  TM Distance: >3 FB  Neck ROM: full     Dental   No notable dental hx     Cardiovascular  Rhythm: regular, Rate: normal, Cardiovascular exam normal    Pulmonary  Pulmonary exam normal     Other Findings        Anesthesia Plan  ASA Score- 2     Anesthesia Type- IV sedation with anesthesia with ASA Monitors  Additional Monitors:   Airway Plan:         Plan Factors-    Induction- intravenous  Postoperative Plan-     Informed Consent- Anesthetic plan and risks discussed with patient

## 2019-12-20 NOTE — DISCHARGE INSTRUCTIONS
Please call [phone] with any problems  Your examination today was normal   I have suggested repeat colonoscopy in 5 years for screening purposes

## 2019-12-20 NOTE — INTERVAL H&P NOTE
H&P reviewed  After examining the patient I find no changes in the patients condition since the H&P had been written      Vitals:    12/20/19 0941   BP: 120/66   Pulse: 84   Resp: 16   Temp: 97 5 °F (36 4 °C)   SpO2: 100%

## 2020-01-13 ENCOUNTER — OFFICE VISIT (OUTPATIENT)
Dept: INTERNAL MEDICINE CLINIC | Facility: CLINIC | Age: 70
End: 2020-01-13
Payer: COMMERCIAL

## 2020-01-13 DIAGNOSIS — R30.0 DYSURIA: ICD-10-CM

## 2020-01-13 DIAGNOSIS — Z92.21 STATUS POST CHEMOTHERAPY: ICD-10-CM

## 2020-01-13 DIAGNOSIS — L30.4 INTERTRIGO: Primary | ICD-10-CM

## 2020-01-13 LAB
SL AMB  POCT GLUCOSE, UA: NORMAL
SL AMB LEUKOCYTE ESTERASE,UA: NORMAL
SL AMB POCT BILIRUBIN,UA: NEGATIVE
SL AMB POCT BLOOD,UA: NEGATIVE
SL AMB POCT CLARITY,UA: CLEAR
SL AMB POCT COLOR,UA: YELLOW
SL AMB POCT KETONES,UA: NEGATIVE
SL AMB POCT NITRITE,UA: NEGATIVE
SL AMB POCT PH,UA: 5
SL AMB POCT SPECIFIC GRAVITY,UA: 1.01
SL AMB POCT URINE PROTEIN: NEGATIVE
SL AMB POCT UROBILINOGEN: NEGATIVE

## 2020-01-13 PROCEDURE — 3008F BODY MASS INDEX DOCD: CPT | Performed by: INTERNAL MEDICINE

## 2020-01-13 PROCEDURE — 1101F PT FALLS ASSESS-DOCD LE1/YR: CPT | Performed by: INTERNAL MEDICINE

## 2020-01-13 PROCEDURE — 3288F FALL RISK ASSESSMENT DOCD: CPT | Performed by: INTERNAL MEDICINE

## 2020-01-13 PROCEDURE — 99214 OFFICE O/P EST MOD 30 MIN: CPT | Performed by: INTERNAL MEDICINE

## 2020-01-13 PROCEDURE — 81002 URINALYSIS NONAUTO W/O SCOPE: CPT | Performed by: INTERNAL MEDICINE

## 2020-01-13 RX ORDER — NYSTATIN 100000 [USP'U]/G
POWDER TOPICAL 4 TIMES DAILY
Qty: 15 G | Refills: 0 | Status: SHIPPED | OUTPATIENT
Start: 2020-01-13 | End: 2021-12-17

## 2020-01-13 RX ORDER — PHENAZOPYRIDINE HYDROCHLORIDE 200 MG/1
200 TABLET, FILM COATED ORAL
Qty: 60 TABLET | Refills: 0 | Status: SHIPPED | OUTPATIENT
Start: 2020-01-13 | End: 2020-09-11

## 2020-01-13 NOTE — PROGRESS NOTES
Assessment/Plan:     Diagnoses and all orders for this visit:    Intertrigo  -     nystatin (MYCOSTATIN) powder; Apply topically 4 (four) times a day    Dysuria  -     phenazopyridine (PYRIDIUM) 200 mg tablet; Take 1 tablet (200 mg total) by mouth 3 (three) times a day with meals  -     Ambulatory referral to Urology; Future  -     POCT urine dip    Status post chemotherapy  -     Ambulatory referral to Urology; Future          Subjective:      Patient ID: Aileen Schilder is a 71 y o  female  HPI Acey Schilder is here for 2 reasons she has an itchy rash on her lower abdomen and also she has had persistent dysuria she has completed chemotherapy at [facility] for left breast cancer and over the last couple of weeks she has been plagued by very painful urination she is not aware of any blood or change in color sediment apparently she saw another physician urine culture was reported to show less than 10,000 colonies a urinalysis that we did in the office showed a small number of of white cells but was otherwise entirely benign with a specific gravity of 10 10    The following portions of the patient's history were reviewed and updated as appropriate: allergies, current medications, past family history, past medical history, past social history, past surgical history and problem list     Review of Systems      Objective:      /70   Pulse 81   Temp 98 3 °F (36 8 °C) (Tympanic)   Ht 5' 3" (1 6 m)   Wt 77 6 kg (171 lb)   SpO2 97%   BMI 30 29 kg/m²    BP Readings from Last 3 Encounters:   01/10/20 122/80   12/20/19 106/58   01/17/19 120/70      Wt Readings from Last 3 Encounters:   01/13/20 77 6 kg (171 lb)   01/10/20 76 2 kg (168 lb)   12/20/19 75 8 kg (167 lb)      BMI: Estimated body mass index is 30 29 kg/m² as calculated from the following:    Height as of this encounter: 5' 3" (1 6 m)  Weight as of this encounter: 77 6 kg (171 lb)      BSA: Estimated body surface area is 1 81 meters squared as calculated from the following:    Height as of this encounter: 5' 3" (1 6 m)  Weight as of this encounter: 77 6 kg (171 lb)  Physical Exam  pressure was 120 over 70 over 70 she appears well there is a surgical scar in the lower abdomen that appears red excoriated and consistent with a yeast   We will give the patient empirical supply FN as appeared in 200 mg to take 2 or 3 times a day will refer to urologist going to ask her to get in touch with [facility] and see if 1 of the chemotherapeutic agents could have caused an interstitial cystitis type of reaction  Will use a at a anti fungal powder for the intertrigo      Current Outpatient Medications:     aspirin 81 MG tablet, Take 81 mg by mouth daily  , Disp: , Rfl:     Biotin 1 MG CAPS, Take by mouth, Disp: , Rfl:     Cholecalciferol (EQL VITAMIN D3) 2000 units CAPS, Take 1 capsule by mouth daily, Disp: , Rfl:     Cyanocobalamin ER (TH VITAMIN B12 ER) 1000 MCG TBCR, Take 1 tablet by mouth daily, Disp: , Rfl:     ezetimibe 10 mg TABS 10 mg, atorvastatin 40 mg TABS 40 mg, Take by mouth daily, Disp: , Rfl:     Multiple Vitamin (MULTI-VITAMINS PO), Take 1 tablet by mouth daily, Disp: , Rfl:     Omega-3 Fatty Acids (FISH OIL MAXIMUM STRENGTH) 1200 MG CPDR, Take 2 capsules by mouth daily, Disp: , Rfl:     vitamin E 100 UNIT capsule, Take 100 Units by mouth daily  , Disp: , Rfl:     nystatin (MYCOSTATIN) powder, Apply topically 4 (four) times a day, Disp: 15 g, Rfl: 0    phenazopyridine (PYRIDIUM) 200 mg tablet, Take 1 tablet (200 mg total) by mouth 3 (three) times a day with meals, Disp: 60 tablet, Rfl: 0    This note was completed in part utilizing [employer] Software  Grammatical errors, random word insertions, spelling mistakes, and incomplete sentences can be an occasional consequence of this system secondary to software limitations, ambient noise, and hardware issues   If you have any question or concerns about the content, text, or information contained within the body of this dictation, please contact the provider for clarification

## 2020-01-15 VITALS
TEMPERATURE: 98.3 F | SYSTOLIC BLOOD PRESSURE: 120 MMHG | WEIGHT: 171 LBS | HEIGHT: 63 IN | HEART RATE: 81 BPM | BODY MASS INDEX: 30.3 KG/M2 | OXYGEN SATURATION: 97 % | DIASTOLIC BLOOD PRESSURE: 70 MMHG

## 2020-01-16 ENCOUNTER — TELEPHONE (OUTPATIENT)
Dept: INTERNAL MEDICINE CLINIC | Facility: CLINIC | Age: 70
End: 2020-01-16

## 2020-01-16 DIAGNOSIS — R30.0 DYSURIA: Primary | ICD-10-CM

## 2020-01-16 RX ORDER — NITROFURANTOIN 25; 75 MG/1; MG/1
100 CAPSULE ORAL 2 TIMES DAILY
Qty: 10 CAPSULE | Refills: 0 | Status: CANCELLED | OUTPATIENT
Start: 2020-01-16 | End: 2020-01-21

## 2020-01-16 NOTE — TELEPHONE ENCOUNTER
Patient has c/o frequency constantly urinating, she has been up all night and had to request off from work   She is requesting a antibiotic

## 2020-01-19 ENCOUNTER — OFFICE VISIT (OUTPATIENT)
Dept: URGENT CARE | Facility: MEDICAL CENTER | Age: 70
End: 2020-01-19
Payer: COMMERCIAL

## 2020-01-19 VITALS
OXYGEN SATURATION: 98 % | SYSTOLIC BLOOD PRESSURE: 116 MMHG | HEIGHT: 63 IN | RESPIRATION RATE: 16 BRPM | TEMPERATURE: 96.4 F | WEIGHT: 165 LBS | DIASTOLIC BLOOD PRESSURE: 66 MMHG | BODY MASS INDEX: 29.23 KG/M2 | HEART RATE: 76 BPM

## 2020-01-19 DIAGNOSIS — B37.2 CANDIDIASIS OF SKIN: Primary | ICD-10-CM

## 2020-01-19 PROCEDURE — G0382 LEV 3 HOSP TYPE B ED VISIT: HCPCS | Performed by: PHYSICIAN ASSISTANT

## 2020-01-19 RX ORDER — NYSTATIN 100000 U/G
CREAM TOPICAL 2 TIMES DAILY
Qty: 30 G | Refills: 0 | Status: SHIPPED | OUTPATIENT
Start: 2020-01-19 | End: 2021-12-17

## 2020-01-19 NOTE — PATIENT INSTRUCTIONS
Continue to keep the area clean, dry  Use prescribed nystatin powder and cream    place gauze or  Other material to absorb moisture between skin folds  Use over-the-counter pain relief if needed for pain  Skin Yeast Infection   WHAT YOU NEED TO KNOW:   Yeast is normally present on the skin  Infection happens when you have too much yeast, or when it gets into a cut on your skin  Certain types of mold and fungus can cause a yeast infection  A skin yeast infection can appear anywhere on your skin or nail beds  Skin yeast infections are usually found on warm, moist parts of the body  Examples include between skin folds or under the breasts  DISCHARGE INSTRUCTIONS:   Return to the emergency department if:   · You have signs of infection, such as pus, warmth or red streaks coming from the wound, or a fever  Contact your healthcare provider if:   · Your symptoms worsen or do not get better within 7 to 10 days  · You have new or returning signs of a skin yeast infection after treatment  · You have questions or concerns about your condition or care  Medicines:   · Antifungal medicine  may be given as a cream, ointment    · Take your medicine as directed  Contact your healthcare provider if you think your medicine is not helping or if you have side effects  Tell him or her if you are allergic to any medicine  Keep a list of the medicines, vitamins, and herbs you take  Include the amounts, and when and why you take them  Bring the list or the pill bottles to follow-up visits  Carry your medicine list with you in case of an emergency  Care for the skin near the infection:  You may only have discolored patches of skin, or areas that are dry and flaking  Care for these skin problems as directed by your healthcare provider  If you have painful skin or an open sore, you will need to protect the skin and prevent damage  You will also need to keep the skin dry as much as possible   Ask your healthcare provider how to care for your skin while the infection clears  The following are general guidelines for caring for painful or open skin:  · Keep the skin clean  Ask your healthcare provider if you should wash with mild soap and water  Do not use soap that contains alcohol  Alcohol can dry and irritate the skin and make symptoms worse  Your baby's healthcare provider may tell you to use diaper cream or ointment when you change his diaper  This will protect the skin and prevent moisture from collecting  · Keep the skin dry  Pat the area dry with a towel  Do not rub, because this may irritate the skin  If you have a skin yeast infection between skin folds, lift the top part gently and hold it while you dry between your skin folds  Always dry your feet completely after you swim or bathe, including between your toes  Dry your skin if you are sweating from exercise or exposure to heat  Use a clean towel each time to prevent spreading or continuing the infection  · Keep the skin protected  Ask your healthcare provider if you should cover the area with a bandage or leave it open  Check your skin each day to make sure you do not have new or worsening problems  You may need to have someone check the skin if you cannot see the area easily    Prevent another skin yeast infection:   · Do not share clothing or towels    · Wear shower shoes if you need to use a public shower    · Dry your feet completely after you bathe, and apply antifungal powder or cream as directed    · Put on socks before you get dressed so you do not spread fungus from your feet    · Wear light clothing that allows air to get to your skin    · Manage your weight to prevent skin folds where yeast can collect    · Manage diabetes    · Change your baby's diaper often, and keep the area clean and dry as much as possible    · Use a diaper cream or ointment that contains zinc oxide or dimethicone on your baby's diaper area as directed  Follow up with your healthcare provider as directed:  Write down your questions so you remember to ask them during your visits  © 2017 [employer] [employer] Information is for End User's use only and may not be sold, redistributed or otherwise used for commercial purposes  All illustrations and images included in CareNotes® are the copyrighted property of A D A M [employer] Inc  or Rajesh Kearney  The above information is an  only  It is not intended as medical advice for individual conditions or treatments  Talk to your doctor, nurse or pharmacist before following any medical regimen to see if it is safe and effective for you

## 2020-01-19 NOTE — LETTER
January 19, 2020     Patient: Juanjo Dee   YOB: 1950   Date of Visit: 1/19/2020       To Whom it May Concern:    Juanjo Dee was seen in my clinic on 1/19/2020  She may return to work on 1/21/2020  If you have any questions or concerns, please don't hesitate to call           Sincerely,          Javier Barrientos PA-C        CC: No Recipients

## 2020-01-19 NOTE — PROGRESS NOTES
[facility]        NAME: Aileen Schilder is a 71 y o  female  : 1950    MRN: [id number]  DATE: 2020  TIME: 3:21 PM    Assessment and Plan   Candidiasis of skin [B37 2]  1  Candidiasis of skin  nystatin (MYCOSTATIN) cream         Patient Instructions     Continue to keep the area clean, dry  Use prescribed nystatin powder and cream    place gauze or  Other material to absorb moisture between skin folds  Use over-the-counter pain relief if needed for pain  Follow up with PCP in 3-5 days  Proceed to  ER if symptoms worsen  Chief Complaint     Chief Complaint   Patient presents with    Rash     Patient presents with an itchy skin rash that started about 2 weeks ago  She notes that she had a colonoscopy and then the rash started  The rash spread from her lower abdomen to her groin and backside  Patient is taking nitrofurantoin and nystatin powder from her PCP  History of Present Illness         68-year-old female presents with rash x2 weeks  Patient states that she was seen once by her primary care physician for this acute issue  She was given nystatin powder at that time  She still states that the area on her pannus has since been improving, but the area between the gluteal cleft is new  She has not yet tried the nystatin powder in the gluteal area  The patient had a colonoscopy at the beginning of this year, and noted afterwards that she started  With urinary symptoms  She was placed on antibiotics by her primary care physician  She states that she is no longer having any urinary symptoms, but noticed that this rash has seem to continue despite antibiotic use  Rash   This is a new problem  Episode onset:  x 2 week  The affected locations include the abdomen  The rash is characterized by itchiness, redness and burning (7/10 pain )  She was exposed to nothing   Pertinent negatives include no anorexia, congestion, cough, diarrhea, eye pain, facial edema, fatigue, fever, joint pain, nail changes, rhinorrhea, shortness of breath, sore throat or vomiting  Treatments tried:   Nystatin powder  The treatment provided significant relief  Review of Systems   Review of Systems   Constitutional: Negative for chills, fatigue and fever  HENT: Negative for congestion, rhinorrhea and sore throat  Eyes: Negative for pain  Respiratory: Negative for cough and shortness of breath  Cardiovascular: Negative for chest pain and palpitations  Gastrointestinal: Negative for anorexia, diarrhea and vomiting  Genitourinary: Negative  Negative for difficulty urinating, flank pain and hematuria  Musculoskeletal: Negative  Negative for joint pain  Skin: Positive for rash  Negative for nail changes  Neurological: Negative            Current Medications       Current Outpatient Medications:     aspirin 81 MG tablet, Take 81 mg by mouth daily  , Disp: , Rfl:     Biotin 1 MG CAPS, Take by mouth, Disp: , Rfl:     Cholecalciferol (EQL VITAMIN D3) 2000 units CAPS, Take 1 capsule by mouth daily, Disp: , Rfl:     Cyanocobalamin ER (TH VITAMIN B12 ER) 1000 MCG TBCR, Take 1 tablet by mouth daily, Disp: , Rfl:     ezetimibe 10 mg TABS 10 mg, atorvastatin 40 mg TABS 40 mg, Take by mouth daily, Disp: , Rfl:     Multiple Vitamin (MULTI-VITAMINS PO), Take 1 tablet by mouth daily, Disp: , Rfl:     nystatin (MYCOSTATIN) powder, Apply topically 4 (four) times a day, Disp: 15 g, Rfl: 0    Omega-3 Fatty Acids (FISH OIL MAXIMUM STRENGTH) 1200 MG CPDR, Take 2 capsules by mouth daily, Disp: , Rfl:     vitamin E 100 UNIT capsule, Take 100 Units by mouth daily  , Disp: , Rfl:     nystatin (MYCOSTATIN) cream, Apply topically 2 (two) times a day, Disp: 30 g, Rfl: 0    phenazopyridine (PYRIDIUM) 200 mg tablet, Take 1 tablet (200 mg total) by mouth 3 (three) times a day with meals (Patient not taking: Reported on 1/19/2020), Disp: 60 tablet, Rfl: 0    Current Allergies     Allergies as of 01/19/2020 - Reviewed 01/19/2020   Allergen Reaction Noted    Other Hives 05/25/2018    Pollen extract  05/25/2018    Benzalkonium chloride Rash 09/14/2018    Neomycin-bacitracin zn-polymyx Rash 07/18/2014            The following portions of the patient's history were reviewed and updated as appropriate: allergies, current medications, past family history, past medical history, past social history, past surgical history and problem list      Past Medical History:   Diagnosis Date    Cancer ([address] )     left breast, lumpectomy,with chemo and radiation    Hyperlipidemia        Past Surgical History:   Procedure Laterality Date    BREAST SURGERY Left     lumpectomy    CATARACT EXTRACTION      last assessed 3/21/16    COLONOSCOPY      NEUROPLASTY / TRANSPOSITION MEDIAN NERVE AT CARPAL TUNNEL      last assessed 3/21/16     TONSILLECTOMY      TOOTH EXTRACTION      last assessed 3/21/16       Family History   Problem Relation Age of Onset    Diabetes Paternal Aunt          Medications have been verified  Objective   /66   Pulse 76   Temp (!) 96 4 °F (35 8 °C) (Tympanic)   Resp 16   Ht 5' 2 5" (1 588 m)   Wt 74 8 kg (165 lb)   SpO2 98%   BMI 29 70 kg/m²        Physical Exam     Physical Exam   Constitutional: She appears well-developed and well-nourished  No distress  HENT:   Head: Normocephalic and atraumatic  Right Ear: External ear normal    Left Ear: External ear normal    Nose: No mucosal edema or rhinorrhea  No foreign bodies  Right sinus exhibits no maxillary sinus tenderness and no frontal sinus tenderness  Left sinus exhibits no maxillary sinus tenderness and no frontal sinus tenderness  Mouth/Throat: Uvula is midline, oropharynx is clear and moist and mucous membranes are normal  No uvula swelling or dental caries  No oropharyngeal exudate, posterior oropharyngeal edema, posterior oropharyngeal erythema or tonsillar abscesses  No tonsillar exudate     Eyes: Pupils are equal, round, and reactive to light  Conjunctivae, EOM and lids are normal  Right eye exhibits no discharge  Left eye exhibits no discharge  No scleral icterus  Cardiovascular: Normal rate, regular rhythm, S1 normal, S2 normal and normal heart sounds  Exam reveals no S3 and no S4  No murmur heard  Pulmonary/Chest: Effort normal and breath sounds normal  No stridor  No respiratory distress  She has no wheezes  She has no rhonchi  She has no rales  Abdominal: Soft  Normal appearance and bowel sounds are normal  She exhibits no distension  There is no hepatosplenomegaly  There is no tenderness  There is no rigidity and no guarding  Musculoskeletal: Normal range of motion  Lymphadenopathy:     She has no cervical adenopathy  Neurological: She is alert  Skin: Skin is warm and dry  Capillary refill takes less than 2 seconds  Rash ( rash located underneath pannus, in between gluteal cleft) noted  She is not diaphoretic  There is erythema  No pallor  Characteristic fungal rash located between the gluteal  Cleft and underneath the abdominal pannus  Area it shows signs of erythema with satellite lesions  Psychiatric: She has a normal mood and affect

## 2020-07-28 ENCOUNTER — TELEMEDICINE (OUTPATIENT)
Dept: INTERNAL MEDICINE CLINIC | Facility: CLINIC | Age: 70
End: 2020-07-28
Payer: COMMERCIAL

## 2020-07-28 DIAGNOSIS — Z20.828 EXPOSURE TO SARS-ASSOCIATED CORONAVIRUS: Primary | ICD-10-CM

## 2020-07-28 DIAGNOSIS — Z20.828 EXPOSURE TO SARS-ASSOCIATED CORONAVIRUS: ICD-10-CM

## 2020-07-28 PROCEDURE — 99213 OFFICE O/P EST LOW 20 MIN: CPT | Performed by: INTERNAL MEDICINE

## 2020-07-28 PROCEDURE — U0003 INFECTIOUS AGENT DETECTION BY NUCLEIC ACID (DNA OR RNA); SEVERE ACUTE RESPIRATORY SYNDROME CORONAVIRUS 2 (SARS-COV-2) (CORONAVIRUS DISEASE [COVID-19]), AMPLIFIED PROBE TECHNIQUE, MAKING USE OF HIGH THROUGHPUT TECHNOLOGIES AS DESCRIBED BY CMS-2020-01-R: HCPCS

## 2020-07-28 NOTE — PROGRESS NOTES
COVID-19 Virtual Visit     Assessment/Plan:    Problem List Items Addressed This Visit     None      Visit Diagnoses     Exposure to SARS-associated coronavirus    -  Primary    Relevant Orders    MISC COVID-19 TEST- Collected at [facility] or [facility]        This virtual check-in was done via [employer] and patient was informed that this is not a secure, HIPAA-complaint platform  She agrees to proceed     Disposition:      I referred Alo Muniz to one of our centralized sites for a COVID-19 swab, works at [employer]   Advised to use steam inhalation to help with nasal and sinus congestion  Call if symptoms get worse  I spent 15 minutes directly with the patient during this visit    Encounter provider Jackeline Johnson MD    Provider located at [facility] [id number]    Recent Visits  No visits were found meeting these conditions  Showing recent visits within past 7 days and meeting all other requirements     Today's Visits  Date Type Provider Dept   07/28/20 Willie Moon MD [facility] [place]   Showing today's visits and meeting all other requirements     Future Appointments  No visits were found meeting these conditions  Showing future appointments within next 150 days and meeting all other requirements        Patient agrees to participate in a virtual check in via telephone or video visit instead of presenting to the office to address urgent/immediate medical needs  Patient is aware this is a billable service  After connecting through [employer], the patient was identified by name and date of birth  Yenni López was informed that this was a telemedicine visit and that the exam was being conducted confidentially over secure lines  My office door was closed  No one else was in the room  Yenni López acknowledged consent and understanding of privacy and security of the telemedicine visit    I informed the patient that I have reviewed her record in [address] [place] [address] and presented the opportunity for her to ask any questions regarding the visit today  The patient agreed to participate  Subjective  Edwina Peacock is a 79 y o  female who is concerned about COVID-19  She tells me her symptoms are not very severe, just feels like she has a cold, has been able to go up and down the steps with no shortness of breath no fevers appetite has been good and been eating well all day  She reports Nasal stuffiness, congestion, postnasal drip  Ryan Heckler She has not experienced fever, chills, cough, shortness of breath, anosmia, abdominal pain and diarrhea She has not had contact with a person who is under investigation for or who is positive for COVID-19 within the last 14 days  She has not been hospitalized recently for fever and/or lower respiratory symptoms      Past Medical History:   Diagnosis Date    Cancer ([facility] [address] )     left breast, lumpectomy,with chemo and radiation    Hyperlipidemia        Past Surgical History:   Procedure Laterality Date    BREAST SURGERY Left     lumpectomy    CATARACT EXTRACTION      last assessed 3/21/16    COLONOSCOPY      NEUROPLASTY / TRANSPOSITION MEDIAN NERVE AT CARPAL TUNNEL      last assessed 3/21/16     TONSILLECTOMY      TOOTH EXTRACTION      last assessed 3/21/16       Current Outpatient Medications   Medication Sig Dispense Refill    aspirin 81 MG tablet Take 81 mg by mouth daily        Biotin 1 MG CAPS Take by mouth      Cholecalciferol (EQL VITAMIN D3) 2000 units CAPS Take 1 capsule by mouth daily      Cyanocobalamin ER (TH VITAMIN B12 ER) 1000 MCG TBCR Take 1 tablet by mouth daily      ezetimibe 10 mg TABS 10 mg, atorvastatin 40 mg TABS 40 mg Take by mouth daily      Multiple Vitamin (MULTI-VITAMINS PO) Take 1 tablet by mouth daily      nystatin (MYCOSTATIN) cream Apply topically 2 (two) times a day 30 g 0    nystatin (MYCOSTATIN) powder Apply topically 4 (four) times a day 15 g 0    Omega-3 Fatty Acids (FISH OIL MAXIMUM STRENGTH) 1200 MG CPDR Take 2 capsules by mouth daily      phenazopyridine (PYRIDIUM) 200 mg tablet Take 1 tablet (200 mg total) by mouth 3 (three) times a day with meals (Patient not taking: Reported on 1/19/2020) 60 tablet 0    vitamin E 100 UNIT capsule Take 100 Units by mouth daily         No current facility-administered medications for this visit  Allergies   Allergen Reactions    Other Hives     Apples, pears, peaches     Pollen Extract     Benzalkonium Chloride Rash    Neomycin-Bacitracin Zn-Polymyx Rash       Review of Systems   Constitutional: Negative for fatigue, fever and unexpected weight change  HENT: Positive for congestion and postnasal drip  Negative for ear pain, hearing loss and sore throat  Eyes: Negative for pain and discharge  Respiratory: Negative for cough, chest tightness and shortness of breath  Cardiovascular: Negative for chest pain and palpitations  Gastrointestinal: Negative for abdominal pain, blood in stool, constipation, diarrhea and nausea  Genitourinary: Negative for dysuria, frequency and hematuria  Musculoskeletal: Negative for arthralgias and joint swelling  Skin: Negative for rash  Allergic/Immunologic: Negative for immunocompromised state  Neurological: Negative for dizziness and headaches  Hematological: Negative for adenopathy  Psychiatric/Behavioral: Negative for confusion and sleep disturbance  Video Exam    There were no vitals filed for this visit  Luna Segundo appears no distress  Physical Exam   Constitutional: She appears well-developed and well-nourished  She does not appear ill  No distress  HENT:   Head: Atraumatic  Nose: Nose normal    Eyes: Conjunctivae and lids are normal    Neck: Neck supple  Pulmonary/Chest: Effort normal  No tachypnea  No respiratory distress  Skin: She is not diaphoretic  Psychiatric: She has a normal mood and affect          VIRTUAL VISIT Ryan Walter acknowledges that she has consented to an online visit or consultation  She understands that the online visit is based solely on information provided by her, and that, in the absence of a face-to-face physical evaluation by the physician, the diagnosis she receives is both limited and provisional in terms of accuracy and completeness  This is not intended to replace a full medical face-to-face evaluation by the physician  Kavita Dixon understands and accepts these terms

## 2020-07-29 ENCOUNTER — NURSE TRIAGE (OUTPATIENT)
Dept: OTHER | Facility: OTHER | Age: 70
End: 2020-07-29

## 2020-07-29 DIAGNOSIS — R05.9 COUGH: Primary | ICD-10-CM

## 2020-07-29 RX ORDER — BENZONATATE 100 MG/1
100 CAPSULE ORAL 3 TIMES DAILY PRN
Qty: 20 CAPSULE | Refills: 0 | Status: SHIPPED | OUTPATIENT
Start: 2020-07-29 | End: 2020-09-11

## 2020-07-29 NOTE — TELEPHONE ENCOUNTER
Pt has tried Ibuprofen 400mg 9AM, hot tea, and Deslym  Reports no relief from tis byt Kolby may be starting to work  Patient requesting a stronger cough medicine to be called into pharmacy  She is aggravated because she called PCP office today and no one called her back  Patient was very rude to [facility] front staff and nursing staff  Often telling me my assessment questions about her cough are stupid  Demanding that she needs cough medication that is stronger to be called into her pharmacy and that this needs to be handled tonight  On-call provider TT  Provider will call in order for Tessalon  Patient made aware  Reason for Disposition   [1] Continuous (nonstop) coughing interferes with work or school AND [2] no improvement using cough treatment per protocol    Answer Assessment - Initial Assessment Questions  1  ONSET: "When did the cough begin?"       1 day ago   2  SEVERITY: "How bad is the cough today?"       "I can't stand it"  3  RESPIRATORY DISTRESS: "Describe your breathing "       Denies difficulty breathing   4  FEVER: "Do you have a fever?" If so, ask: "What is your temperature, how was it measured, and when did it start?"      Denies  5  HEMOPTYSIS: "Are you coughing up any blood?" If so ask: "How much?" (flecks, streaks, tablespoons, etc )      Denies  6  TREATMENT: "What have you done so far to treat the cough?" (e g , meds, fluids, humidifier)      David 10 minutes ago   7  CARDIAC HISTORY: "Do you have any history of heart disease?" (e g , heart attack, congestive heart failure)       Denies  8  LUNG HISTORY: "Do you have any history of lung disease?"  (e g , pulmonary embolus, asthma, emphysema)      Denies  9  PE RISK FACTORS: "Do you have a history of blood clots?" (or: recent major surgery, recent prolonged travel, bedridden)      Denies  10  OTHER SYMPTOMS: "Do you have any other symptoms? (e g , runny nose, wheezing, chest pain)        Runny nose, post nasal drip   11   PREGNANCY: "Is there any chance you are pregnant?" "When was your last menstrual period?"        N/A  12   TRAVEL: "Have you traveled out of the country in the last month?" (e g , travel history, exposures)        Denies    Protocols used: COUGH - ACUTE NON-PRODUCTIVE-ADULT-AH

## 2020-07-29 NOTE — TELEPHONE ENCOUNTER
Regarding: Cough  ----- Message from Mack Zaldivar sent at 7/29/2020  5:36 PM EDT -----  "I am calling because I spoke with someone at the office today and was told I would get a prescription for a cough called in  No one called me back or sent anything over   I would like to get this settled tonight and have something called in "

## 2020-07-30 DIAGNOSIS — R05.9 COUGH: Primary | ICD-10-CM

## 2020-07-30 LAB — SARS-COV-2 RNA SPEC QL NAA+PROBE: NOT DETECTED

## 2020-09-09 RX ORDER — TRIAMCINOLONE ACETONIDE 1 MG/G
CREAM TOPICAL
COMMUNITY
End: 2020-09-11

## 2020-09-11 ENCOUNTER — OFFICE VISIT (OUTPATIENT)
Dept: FAMILY MEDICINE CLINIC | Facility: CLINIC | Age: 70
End: 2020-09-11
Payer: COMMERCIAL

## 2020-09-11 VITALS
OXYGEN SATURATION: 99 % | HEART RATE: 76 BPM | TEMPERATURE: 97.9 F | WEIGHT: 165 LBS | DIASTOLIC BLOOD PRESSURE: 72 MMHG | SYSTOLIC BLOOD PRESSURE: 110 MMHG | HEIGHT: 63 IN | BODY MASS INDEX: 29.23 KG/M2

## 2020-09-11 DIAGNOSIS — D05.12 DUCTAL CARCINOMA IN SITU (DCIS) OF LEFT BREAST: Primary | ICD-10-CM

## 2020-09-11 DIAGNOSIS — Z13.21 ENCOUNTER FOR VITAMIN DEFICIENCY SCREENING: ICD-10-CM

## 2020-09-11 DIAGNOSIS — Z23 NEED FOR VACCINATION: ICD-10-CM

## 2020-09-11 DIAGNOSIS — H69.83 EUSTACHIAN TUBE DYSFUNCTION, BILATERAL: ICD-10-CM

## 2020-09-11 DIAGNOSIS — E78.5 HYPERLIPIDEMIA, UNSPECIFIED HYPERLIPIDEMIA TYPE: ICD-10-CM

## 2020-09-11 DIAGNOSIS — Z13.89 SCREENING FOR GENITOURINARY CONDITION: ICD-10-CM

## 2020-09-11 DIAGNOSIS — Z76.89 ENCOUNTER TO ESTABLISH CARE WITH NEW DOCTOR: ICD-10-CM

## 2020-09-11 PROCEDURE — 90715 TDAP VACCINE 7 YRS/> IM: CPT

## 2020-09-11 PROCEDURE — 90471 IMMUNIZATION ADMIN: CPT

## 2020-09-11 PROCEDURE — 1160F RVW MEDS BY RX/DR IN RCRD: CPT | Performed by: INTERNAL MEDICINE

## 2020-09-11 PROCEDURE — 99214 OFFICE O/P EST MOD 30 MIN: CPT | Performed by: INTERNAL MEDICINE

## 2020-09-11 PROCEDURE — 1036F TOBACCO NON-USER: CPT | Performed by: INTERNAL MEDICINE

## 2020-09-11 RX ORDER — FLUTICASONE PROPIONATE 50 MCG
1 SPRAY, SUSPENSION (ML) NASAL DAILY
Qty: 1 BOTTLE | Refills: 3 | Status: SHIPPED | OUTPATIENT
Start: 2020-09-11 | End: 2022-02-10

## 2020-09-11 NOTE — PROGRESS NOTES
Assessment/Plan:         Diagnoses and all orders for this visit:  Encounter to establish care with new doctor  Available records reviewed  Eustachian tube dysfunction, bilateral  -     fluticasone (FLONASE) 50 mcg/act nasal spray; 1 spray into each nostril daily  Patient was start over-the-counter antihistamine saline nasal irrigation encouraged  Patient was start Flonase as above appropriate use of the medication discussed  Hyperlipidemia, unspecified hyperlipidemia type  -     Lipid panel  -     TSH, 3rd generation with Free T4 reflex  -     Vitamin D 25 hydroxy  Continue with statin lab studies ordered  Screening for genitourinary condition  -     Urinalysis with microscopic    Encounter for vitamin deficiency screening  -     Vitamin D 25 hydroxy    Need for vaccination  -     TDAP VACCINE GREATER THAN OR EQUAL TO 8YO IM      Ductal carcinoma in situ (DCIS) of left breast  -     CBC and differential  -     Comprehensive metabolic panel  -     Vitamin D 25 hydroxy    Subjective:      Patient ID: Nii Rojas is a 79 y o  female  HPI  Patient is here to establish care  Patient has history of ductal carcinoma in-situ and has been following up with Todd Porras has been in remission  Patient recently had an upper respiratory infection was treated with antibiotic but has ongoing bilateral ear discomfort  She does report some postnasal drainage  No fevers and chills cough shortness of breath  Patient also is on statin for her hyperlipidemia and for lab studies  She would also need tetanus vaccine today  Encouraged to get flu vaccine as well  She will come in for 2nd dose of pneumococcal vaccine    The following portions of the patient's history were reviewed and updated as appropriate: allergies, current medications, past family history, past medical history, past social history, past surgical history and problem list     Review of Systems   Constitutional: Negative for appetite change, chills, fatigue, fever and unexpected weight change  HENT: Positive for congestion, ear pain and postnasal drip  Negative for hearing loss, trouble swallowing and voice change  Eyes: Negative for pain and visual disturbance  Respiratory: Negative for cough, chest tightness and shortness of breath  Cardiovascular: Negative for chest pain, palpitations and leg swelling  Gastrointestinal: Negative for abdominal pain, blood in stool, constipation, diarrhea, nausea and vomiting  Endocrine: Negative for cold intolerance, heat intolerance, polydipsia and polyphagia  Genitourinary: Negative for difficulty urinating, flank pain, frequency and hematuria  Musculoskeletal: Negative for arthralgias, back pain, gait problem, joint swelling and myalgias  Skin: Negative for rash  Allergic/Immunologic: Positive for environmental allergies  Neurological: Negative for dizziness, weakness, light-headedness, numbness and headaches  Hematological: Negative for adenopathy  Does not bruise/bleed easily  As above   Psychiatric/Behavioral: Negative for confusion, dysphoric mood and sleep disturbance  Objective:      /72 (BP Location: Right arm, Patient Position: Sitting, Cuff Size: Standard)   Pulse 76   Temp 97 9 °F (36 6 °C)   Ht 5' 2 5" (1 588 m)   Wt 74 8 kg (165 lb)   SpO2 99%   BMI 29 70 kg/m²          Physical Exam  Constitutional:       General: She is not in acute distress  Appearance: She is well-developed  She is not ill-appearing or diaphoretic  HENT:      Head: Normocephalic  Right Ear: Tympanic membrane normal       Left Ear: Tympanic membrane normal    Eyes:      General: No scleral icterus  Pupils: Pupils are equal, round, and reactive to light  Neck:      Thyroid: No thyromegaly  Cardiovascular:      Rate and Rhythm: Normal rate and regular rhythm  Heart sounds: Normal heart sounds  No murmur     Pulmonary:      Effort: Pulmonary effort is normal  No respiratory distress  Breath sounds: Normal breath sounds  No wheezing or rales  Abdominal:      General: Bowel sounds are normal  There is no distension  Palpations: Abdomen is soft  Tenderness: There is no abdominal tenderness  There is no right CVA tenderness, left CVA tenderness, guarding or rebound  Musculoskeletal:      Right lower leg: No edema  Left lower leg: No edema  Lymphadenopathy:      Cervical: No cervical adenopathy  Skin:     General: Skin is warm  Neurological:      Mental Status: She is oriented to person, place, and time  Cranial Nerves: No cranial nerve deficit  Deep Tendon Reflexes: Reflexes are normal and symmetric  Psychiatric:         Mood and Affect: Mood normal          Behavior: Behavior normal          Thought Content: Thought content normal          Judgment: Judgment normal              BMI Counseling: Body mass index is 29 7 kg/m²  The BMI is above normal  Nutrition recommendations include decreasing portion sizes, encouraging healthy choices of fruits and vegetables, decreasing fast food intake, consuming healthier snacks, moderation in carbohydrate intake, increasing intake of lean protein, reducing intake of saturated and trans fat and reducing intake of cholesterol  Exercise recommendations include exercising 3-5 times per week and strength training exercises  No pharmacotherapy was ordered   Patient referred to PCP

## 2020-09-12 ENCOUNTER — NURSE TRIAGE (OUTPATIENT)
Dept: OTHER | Facility: OTHER | Age: 70
End: 2020-09-12

## 2020-09-12 NOTE — TELEPHONE ENCOUNTER
Reason for Disposition   [1] COVID-19 EXPOSURE (Close Contact) within last 14 days AND [2] needs COVID-19 lab test to return to work AND [3] NO symptoms    Answer Assessment - Initial Assessment Questions  Reason for Disposition   [1] COVID-19 EXPOSURE (Close Contact) AND [2] within last 14 days BUT [3] NO symptoms    Answer Assessment - Initial Assessment Questions  1  CLOSE CONTACT: "Who is the person with the confirmed or suspected COVID-19 infection that you were exposed to?"      Friend of the family     2  PLACE of CONTACT: "Where were you when you were exposed to COVID-19?" (e g , home, school, medical waiting room; which city?)      Home    3  TYPE of CONTACT: "How much contact was there?" (e g , sitting next to, live in same house, work in same office, same building)      Stayed over their house for 3 nights    4  DURATION of CONTACT: "How long were you in contact with the COVID-19 patient?" (e g , a few seconds, passed by person, a few minutes, live with the patient)      72 hours     5  DATE of CONTACT: "When did you have contact with a COVID-19 patient?" (e g , how many days ago)      Labor day weekend     6  TRAVEL: "Have you traveled out of the country recently?" If so, "When and where?"      * Also ask about out-of-state travel, since the CDC has identified some high-risk cities for community spread in the [address] [place] [address],[address]  * Note: Travel becomes less relevant if there is widespread community transmission where the patient lives  Denies     7  COMMUNITY SPREAD: "Are there lots of cases of COVID-19 (community spread) where you live?" (See public health department website, if unsure)        [facility]     8  SYMPTOMS: "Do you have any symptoms?" (e g , fever, cough, breathing difficulty)      Denies    10  HIGH RISK: "Do you have any heart or lung problems?  Do you have a weak immune system?" (e g , CHF, COPD, asthma, HIV positive, chemotherapy, renal failure, diabetes mellitus, sickle cell anemia) Denies    Protocols used: CORONAVIRUS (COVID-19) EXPOSURE-ADULT-AH

## 2020-09-12 NOTE — TELEPHONE ENCOUNTER
Regarding: COVID concerns  ----- Message from Usha Nelson sent at 9/12/2020  7:14 AM EDT -----  2 of 2 family members: "my  and I had a house guest over the Labor day weekend and found out yesterday they are positive for COVID  I just had breast cancer and did not sleep at all last night worried about this   I think the best thing would be for my  and I to be tested "

## 2020-09-14 DIAGNOSIS — Z20.828 EXPOSURE TO SARS-ASSOCIATED CORONAVIRUS: Primary | ICD-10-CM

## 2020-09-14 PROCEDURE — U0003 INFECTIOUS AGENT DETECTION BY NUCLEIC ACID (DNA OR RNA); SEVERE ACUTE RESPIRATORY SYNDROME CORONAVIRUS 2 (SARS-COV-2) (CORONAVIRUS DISEASE [COVID-19]), AMPLIFIED PROBE TECHNIQUE, MAKING USE OF HIGH THROUGHPUT TECHNOLOGIES AS DESCRIBED BY CMS-2020-01-R: HCPCS | Performed by: INTERNAL MEDICINE

## 2020-09-15 ENCOUNTER — TELEPHONE (OUTPATIENT)
Dept: FAMILY MEDICINE CLINIC | Facility: CLINIC | Age: 70
End: 2020-09-15

## 2020-09-15 LAB — SARS-COV-2 RNA SPEC QL NAA+PROBE: NOT DETECTED

## 2020-09-25 ENCOUNTER — TELEPHONE (OUTPATIENT)
Dept: FAMILY MEDICINE CLINIC | Facility: CLINIC | Age: 70
End: 2020-09-25

## 2020-09-25 NOTE — TELEPHONE ENCOUNTER
Pt called in checking the status on paperwork that she needed done by today  Please advise thank you

## 2020-09-29 ENCOUNTER — TELEPHONE (OUTPATIENT)
Dept: FAMILY MEDICINE CLINIC | Facility: CLINIC | Age: 70
End: 2020-09-29

## 2020-09-29 NOTE — TELEPHONE ENCOUNTER
Swati Arenas 5/6/1943 is still in [address] wanted to update us  They are in the process of having him transferred to [facility]  Patient is stable but they are wanting him in a higher level hospital    Dea Cassette also was letting us now daughter is being seen virtually today with a URI  She wanted to let you know she had a very bad experience at [facility] ED yesterday  I assured her we will take good care of her

## 2020-09-29 NOTE — TELEPHONE ENCOUNTER
Unfortunately, For her info I would not be involved in the care of Jacinto Ross as he has not established with me    Dr Huerta Loud still be taking care of him

## 2020-10-01 DIAGNOSIS — Z20.828 EXPOSURE TO SARS-ASSOCIATED CORONAVIRUS: Primary | ICD-10-CM

## 2020-10-01 DIAGNOSIS — E78.5 HYPERLIPIDEMIA, UNSPECIFIED HYPERLIPIDEMIA TYPE: Primary | ICD-10-CM

## 2020-10-01 RX ORDER — EZETIMIBE AND SIMVASTATIN 10; 40 MG/1; MG/1
1 TABLET ORAL
Qty: 90 TABLET | Refills: 1 | Status: SHIPPED | OUTPATIENT
Start: 2020-10-01 | End: 2022-02-03

## 2020-10-02 ENCOUNTER — TELEPHONE (OUTPATIENT)
Dept: FAMILY MEDICINE CLINIC | Facility: CLINIC | Age: 70
End: 2020-10-02

## 2020-10-02 DIAGNOSIS — Z20.828 EXPOSURE TO SARS-ASSOCIATED CORONAVIRUS: ICD-10-CM

## 2020-10-02 PROCEDURE — U0003 INFECTIOUS AGENT DETECTION BY NUCLEIC ACID (DNA OR RNA); SEVERE ACUTE RESPIRATORY SYNDROME CORONAVIRUS 2 (SARS-COV-2) (CORONAVIRUS DISEASE [COVID-19]), AMPLIFIED PROBE TECHNIQUE, MAKING USE OF HIGH THROUGHPUT TECHNOLOGIES AS DESCRIBED BY CMS-2020-01-R: HCPCS | Performed by: INTERNAL MEDICINE

## 2020-10-03 LAB — SARS-COV-2 RNA SPEC QL NAA+PROBE: NOT DETECTED

## 2020-10-05 ENCOUNTER — TELEPHONE (OUTPATIENT)
Dept: FAMILY MEDICINE CLINIC | Facility: CLINIC | Age: 70
End: 2020-10-05

## 2020-10-07 ENCOUNTER — TELEPHONE (OUTPATIENT)
Dept: FAMILY MEDICINE CLINIC | Facility: CLINIC | Age: 70
End: 2020-10-07

## 2020-10-08 ENCOUNTER — TELEPHONE (OUTPATIENT)
Dept: FAMILY MEDICINE CLINIC | Facility: CLINIC | Age: 70
End: 2020-10-08

## 2020-10-19 ENCOUNTER — TELEPHONE (OUTPATIENT)
Dept: FAMILY MEDICINE CLINIC | Facility: CLINIC | Age: 70
End: 2020-10-19

## 2020-10-28 ENCOUNTER — TELEPHONE (OUTPATIENT)
Dept: FAMILY MEDICINE CLINIC | Facility: CLINIC | Age: 70
End: 2020-10-28

## 2020-10-29 ENCOUNTER — OFFICE VISIT (OUTPATIENT)
Dept: FAMILY MEDICINE CLINIC | Facility: CLINIC | Age: 70
End: 2020-10-29
Payer: COMMERCIAL

## 2020-10-29 VITALS
WEIGHT: 168 LBS | OXYGEN SATURATION: 99 % | HEIGHT: 63 IN | HEART RATE: 78 BPM | SYSTOLIC BLOOD PRESSURE: 100 MMHG | TEMPERATURE: 97.8 F | BODY MASS INDEX: 29.77 KG/M2 | DIASTOLIC BLOOD PRESSURE: 68 MMHG

## 2020-10-29 DIAGNOSIS — F41.9 ANXIETY DISORDER, UNSPECIFIED TYPE: Primary | ICD-10-CM

## 2020-10-29 DIAGNOSIS — Z76.89 RETURN TO WORK EVALUATION: ICD-10-CM

## 2020-10-29 PROCEDURE — 1160F RVW MEDS BY RX/DR IN RCRD: CPT | Performed by: INTERNAL MEDICINE

## 2020-10-29 PROCEDURE — 99213 OFFICE O/P EST LOW 20 MIN: CPT | Performed by: INTERNAL MEDICINE

## 2020-10-29 PROCEDURE — 3008F BODY MASS INDEX DOCD: CPT | Performed by: INTERNAL MEDICINE

## 2020-11-16 ENCOUNTER — TELEPHONE (OUTPATIENT)
Dept: FAMILY MEDICINE CLINIC | Facility: CLINIC | Age: 70
End: 2020-11-16

## 2020-12-04 ENCOUNTER — OFFICE VISIT (OUTPATIENT)
Dept: FAMILY MEDICINE CLINIC | Facility: CLINIC | Age: 70
End: 2020-12-04
Payer: COMMERCIAL

## 2020-12-04 VITALS
RESPIRATION RATE: 18 BRPM | HEART RATE: 79 BPM | HEIGHT: 63 IN | WEIGHT: 164.2 LBS | OXYGEN SATURATION: 97 % | DIASTOLIC BLOOD PRESSURE: 78 MMHG | BODY MASS INDEX: 29.09 KG/M2 | SYSTOLIC BLOOD PRESSURE: 120 MMHG | TEMPERATURE: 96 F

## 2020-12-04 DIAGNOSIS — I83.812 VARICOSE VEINS OF LEG WITH PAIN, LEFT: Primary | ICD-10-CM

## 2020-12-04 PROCEDURE — 99213 OFFICE O/P EST LOW 20 MIN: CPT | Performed by: INTERNAL MEDICINE

## 2020-12-04 PROCEDURE — 3008F BODY MASS INDEX DOCD: CPT | Performed by: INTERNAL MEDICINE

## 2020-12-04 RX ORDER — NITROFURANTOIN 25; 75 MG/1; MG/1
CAPSULE ORAL
COMMUNITY
End: 2021-12-17

## 2020-12-10 ENCOUNTER — TELEPHONE (OUTPATIENT)
Dept: FAMILY MEDICINE CLINIC | Facility: CLINIC | Age: 70
End: 2020-12-10

## 2020-12-31 ENCOUNTER — OFFICE VISIT (OUTPATIENT)
Dept: FAMILY MEDICINE CLINIC | Facility: CLINIC | Age: 70
End: 2020-12-31
Payer: COMMERCIAL

## 2020-12-31 VITALS
SYSTOLIC BLOOD PRESSURE: 138 MMHG | DIASTOLIC BLOOD PRESSURE: 80 MMHG | WEIGHT: 163.4 LBS | BODY MASS INDEX: 30.07 KG/M2 | TEMPERATURE: 95.6 F | RESPIRATION RATE: 16 BRPM | HEART RATE: 83 BPM | HEIGHT: 62 IN | OXYGEN SATURATION: 98 %

## 2020-12-31 DIAGNOSIS — R73.9 HYPERGLYCEMIA: ICD-10-CM

## 2020-12-31 DIAGNOSIS — R03.0 TRANSIENT ELEVATED BLOOD PRESSURE: Primary | ICD-10-CM

## 2020-12-31 PROCEDURE — 99213 OFFICE O/P EST LOW 20 MIN: CPT | Performed by: INTERNAL MEDICINE

## 2021-01-07 ENCOUNTER — TELEPHONE (OUTPATIENT)
Dept: FAMILY MEDICINE CLINIC | Facility: CLINIC | Age: 71
End: 2021-01-07

## 2021-01-07 NOTE — TELEPHONE ENCOUNTER
Patient left message on voice mail asking for a COVID test to be done  She stated the her grandson who is 1 months old tested positive for COVID    Please advise the patient

## 2021-01-08 DIAGNOSIS — Z20.822 CLOSE EXPOSURE TO COVID-19 VIRUS: Primary | ICD-10-CM

## 2021-01-08 PROCEDURE — U0005 INFEC AGEN DETEC AMPLI PROBE: HCPCS | Performed by: INTERNAL MEDICINE

## 2021-01-08 PROCEDURE — U0003 INFECTIOUS AGENT DETECTION BY NUCLEIC ACID (DNA OR RNA); SEVERE ACUTE RESPIRATORY SYNDROME CORONAVIRUS 2 (SARS-COV-2) (CORONAVIRUS DISEASE [COVID-19]), AMPLIFIED PROBE TECHNIQUE, MAKING USE OF HIGH THROUGHPUT TECHNOLOGIES AS DESCRIBED BY CMS-2020-01-R: HCPCS | Performed by: INTERNAL MEDICINE

## 2021-01-09 LAB — SARS-COV-2 RNA SPEC QL NAA+PROBE: NOT DETECTED

## 2021-07-31 ENCOUNTER — OFFICE VISIT (OUTPATIENT)
Dept: URGENT CARE | Facility: MEDICAL CENTER | Age: 71
End: 2021-07-31
Payer: COMMERCIAL

## 2021-07-31 VITALS
TEMPERATURE: 97.7 F | HEART RATE: 77 BPM | RESPIRATION RATE: 20 BRPM | DIASTOLIC BLOOD PRESSURE: 75 MMHG | WEIGHT: 162 LBS | OXYGEN SATURATION: 98 % | HEIGHT: 62 IN | BODY MASS INDEX: 29.81 KG/M2 | SYSTOLIC BLOOD PRESSURE: 142 MMHG

## 2021-07-31 DIAGNOSIS — L23.7 POISON IVY: Primary | ICD-10-CM

## 2021-07-31 PROCEDURE — 99213 OFFICE O/P EST LOW 20 MIN: CPT | Performed by: PHYSICIAN ASSISTANT

## 2021-07-31 RX ORDER — PREDNISONE 10 MG/1
TABLET ORAL
Qty: 20 TABLET | Refills: 0 | Status: SHIPPED | OUTPATIENT
Start: 2021-07-31 | End: 2021-12-17

## 2021-07-31 NOTE — PATIENT INSTRUCTIONS
Poison Ivy   WHAT YOU NEED TO KNOW:   Poison ivy is a plant that can cause an itchy, uncomfortable rash on your skin  Poison ivy grows as a shrub or vine in woods, fields, and areas of thick [place]  It has 3 bright green leaves on each stem that turn red in toño  DISCHARGE INSTRUCTIONS:   Medicines:   · Antiseptic or drying creams or ointments: These medicines may be used to dry out the rash and decrease the itching  These products may be available without a doctor's order  · Steroids: This medicine helps decrease itching and inflammation  It can be given as a cream to apply to your skin or as a pill  · Antihistamines: This medicine may help decrease itching and help you sleep  It is available without a doctor's order  · Take your medicine as directed  Contact your healthcare provider if you think your medicine is not helping or if you have side effects  Tell him of her if you are allergic to any medicine  Keep a list of the medicines, vitamins, and herbs you take  Include the amounts, and when and why you take them  Bring the list or the pill bottles to follow-up visits  Carry your medicine list with you in case of an emergency  Follow up with your healthcare provider as directed:  Write down your questions so you remember to ask them during your visits  How your poison ivy rash spreads: You cannot spread poison ivy by touching your rash or the liquid from your blisters  Poison ivy is spread only if you scratch your skin while it still has oil on it  You may think your rash is spreading because new rashes appear over a number of days  This happens because areas covered by thin skin break out in a rash first  Your face or forearms may develop a rash before thicker areas, such as the palms of your hands  Self-care:   · Keep your rash clean and dry:  Wash it with soap and water  Gently pat it dry with a clean towel  · Try not to scratch or rub your rash:   This can cause your skin to become infected  · Use a compress on your rash:  Dip a clean washcloth in cool water  Wring it out and place it on your rash  Leave the washcloth on your skin for 15 minutes  Do this at least 3 times per day  · Take a cornstarch or oatmeal bath: If your rash is too large to cover with wet washcloths, take 3 or 4 cornstarch baths daily  Mix 1 pound of cornstarch with a little water to make a paste  Add the paste to a tub full of water and mix well  You may also use colloidal oatmeal in the bath water  Use lukewarm water  Avoid hot water because it may cause your itching to increase  Prevent a poison ivy rash in the future:   · Wear skin protection:  Wear long pants, a long-sleeved shirt, and gloves  Use a skin block lotion to protect your skin from poison ivy oil  You can find this at a drugstore without a prescription  · Wash clothing after possible exposure: If you think you have been near a poison ivy plant, wash the clothes you were wearing separately from other clothes  Rinse the washing machine well after you take the clothes out  Scrub boots and shoes with warm, soapy water  Dry clean items and clothing that you cannot wash in water  Poison ivy oil is sticky and can stay on surfaces for a long time  It can cause a new rash even years later  · Bathe your pet:  Use warm water and shampoo on your pet's fur  This will prevent the spread of oil to your skin, car, and home  Wear long sleeves, long pants, and gloves while washing pets or any items that may have oil on them  · Reduce exposure to poison ivy:  Do not touch plants that look like poison ivy  Keep your yard free of poison ivy  While protecting your skin, remove the plant and the roots  Place them in a plastic bag and seal the bag tightly  · Do not burn poison ivy plants: This can spread the oil through the air  If you breathe the oil into your lungs, you could have swelling and serious breathing problems   Oil that clings to the fire [place] can land on your skin and cause a rash  Contact your healthcare provider if:   · You have pus, soft yellow scabs, or tenderness on the rash  · The itching gets worse or keeps you awake at night  · The rash covers more than 1/4 of your skin or spreads to your eyes, mouth, or genital area  · The rash is not better after 2 to 3 weeks  · You have tender, swollen glands on the sides of your neck  · You have swelling in your arms and legs  · You have questions or concerns about your condition or care  Return to the emergency department if:   · You have a fever  · You have redness, swelling, and tenderness around the rash  · You have trouble breathing  © Copyright [employer] 2021 Information is for End User's use only and may not be sold, redistributed or otherwise used for commercial purposes  All illustrations and images included in CareNotes® are the copyrighted property of A D A M , Inc  or [facility] Seymour Pratt   The above information is an  only  It is not intended as medical advice for individual conditions or treatments  Talk to your doctor, nurse or pharmacist before following any medical regimen to see if it is safe and effective for you

## 2021-07-31 NOTE — PROGRESS NOTES
[facility]        NAME: Soren Mcintosh is a 70 y o  female  : 1950    MRN: [id number]  DATE: 2021  TIME: 10:15 AM    /75   Pulse 77   Temp 97 7 °F (36 5 °C)   Resp 20   Ht 5' 2" (1 575 m)   Wt 73 5 kg (162 lb)   SpO2 98%   BMI 29 63 kg/m²     Assessment and Plan   Poison ivy [L23 7]  1  Poison ivy  predniSONE 10 mg tablet         Patient Instructions       Follow up with PCP in 3-5 days  Proceed to  ER if symptoms worsen  Chief Complaint     Chief Complaint   Patient presents with    Rash     Patient states she started with a rash on her R lower leg  She woke today with poison ivy all over her thighds and her inner thighs are excorated  History of Present Illness       Pt with rash /poison ivy x 2 days     Rash        Review of Systems   Review of Systems   Constitutional: Negative  HENT: Negative  Eyes: Negative  Respiratory: Negative  Cardiovascular: Negative  Gastrointestinal: Negative  Endocrine: Negative  Genitourinary: Negative  Musculoskeletal: Negative  Skin: Positive for rash  Allergic/Immunologic: Negative  Neurological: Negative  Hematological: Negative  Psychiatric/Behavioral: Negative  All other systems reviewed and are negative          Current Medications       Current Outpatient Medications:     aspirin 81 MG tablet, Take 81 mg by mouth daily  , Disp: , Rfl:     Biotin 1 MG CAPS, Take by mouth, Disp: , Rfl:     Cholecalciferol (EQL VITAMIN D3) 2000 units CAPS, Take 1 capsule by mouth daily, Disp: , Rfl:     Cyanocobalamin ER (TH VITAMIN B12 ER) 1000 MCG TBCR, Take 1 tablet by mouth daily, Disp: , Rfl:     ezetimibe 10 mg TABS 10 mg, atorvastatin 40 mg TABS 40 mg, Take by mouth daily, Disp: , Rfl:     Menaquinone-7 (VITAMIN K2 PO), Take by mouth, Disp: , Rfl:     Multiple Vitamin (MULTI-VITAMINS PO), Take 1 tablet by mouth daily, Disp: , Rfl:     vitamin E 100 UNIT capsule, Take 100 Units by mouth daily , Disp: , Rfl:     ezetimibe-simvastatin (VYTORIN) 10-40 mg per tablet, Take 1 tablet by mouth daily at bedtime (Patient not taking: Reported on 7/31/2021), Disp: 90 tablet, Rfl: 1    fluticasone (FLONASE) 50 mcg/act nasal spray, 1 spray into each nostril daily (Patient not taking: Reported on 12/31/2020), Disp: 1 Bottle, Rfl: 3    nitrofurantoin (MACROBID) 100 mg capsule, nitrofurantoin monohydrate/macrocrystals 100 mg capsule (Patient not taking: Reported on 7/31/2021), Disp: , Rfl:     nystatin (MYCOSTATIN) cream, Apply topically 2 (two) times a day (Patient not taking: Reported on 9/1/2020), Disp: 30 g, Rfl: 0    nystatin (MYCOSTATIN) powder, Apply topically 4 (four) times a day (Patient not taking: Reported on 9/1/2020), Disp: 15 g, Rfl: 0    Omega-3 Fatty Acids (FISH OIL MAXIMUM STRENGTH) 1200 MG CPDR, Take 2 capsules by mouth daily, Disp: , Rfl:     predniSONE 10 mg tablet, 4 tabs po qd x 2 days then 3 tabs po qd x 2 days then 2 tabs po qd x 2 days then 1 tab po qd x 2 days, Disp: 20 tablet, Rfl: 0    Current Allergies     Allergies as of 07/31/2021 - Reviewed 07/31/2021   Allergen Reaction Noted    Other Hives 05/25/2018    Pollen extract  05/25/2018    Benzalkonium chloride Rash 09/14/2018    Neomycin-bacitracin zn-polymyx Rash 07/18/2014            The following portions of the patient's history were reviewed and updated as appropriate: allergies, current medications, past family history, past medical history, past social history, past surgical history and problem list      Past Medical History:   Diagnosis Date    Cancer ([address] )     left breast, lumpectomy,with chemo and radiation    Hyperlipidemia        Past Surgical History:   Procedure Laterality Date    BREAST SURGERY Left     lumpectomy    CATARACT EXTRACTION      last assessed 3/21/16    COLONOSCOPY      NEUROPLASTY / TRANSPOSITION MEDIAN NERVE AT CARPAL TUNNEL      last assessed 3/21/16     TONSILLECTOMY      TOOTH EXTRACTION last assessed 3/21/16       Family History   Problem Relation Age of Onset    Diabetes Paternal Aunt          Medications have been verified  Objective   /75   Pulse 77   Temp 97 7 °F (36 5 °C)   Resp 20   Ht 5' 2" (1 575 m)   Wt 73 5 kg (162 lb)   SpO2 98%   BMI 29 63 kg/m²        Physical Exam     Physical Exam  Vitals and nursing note reviewed  Constitutional:       Appearance: Normal appearance  She is normal weight  HENT:      Head: Normocephalic and atraumatic  Right Ear: Tympanic membrane, ear canal and external ear normal       Left Ear: Tympanic membrane, ear canal and external ear normal       Nose: Nose normal       Mouth/Throat:      Mouth: Mucous membranes are moist       Pharynx: Oropharynx is clear  Eyes:      Extraocular Movements: Extraocular movements intact  Conjunctiva/sclera: Conjunctivae normal       Pupils: Pupils are equal, round, and reactive to light  Cardiovascular:      Rate and Rhythm: Normal rate and regular rhythm  Pulses: Normal pulses  Heart sounds: Normal heart sounds  Pulmonary:      Effort: Pulmonary effort is normal       Breath sounds: Normal breath sounds  Abdominal:      General: Abdomen is flat  Bowel sounds are normal       Palpations: Abdomen is soft  Musculoskeletal:         General: Normal range of motion  Cervical back: Normal range of motion and neck supple  Skin:     General: Skin is warm  Capillary Refill: Capillary refill takes less than 2 seconds  Comments: Poison ivy to arms legs torso   Neurological:      General: No focal deficit present  Mental Status: She is alert and oriented to person, place, and time     Psychiatric:         Mood and Affect: Mood normal          Behavior: Behavior normal

## 2021-12-17 ENCOUNTER — OFFICE VISIT (OUTPATIENT)
Dept: FAMILY MEDICINE CLINIC | Facility: CLINIC | Age: 71
End: 2021-12-17
Payer: COMMERCIAL

## 2021-12-17 VITALS
RESPIRATION RATE: 16 BRPM | OXYGEN SATURATION: 97 % | HEIGHT: 62 IN | BODY MASS INDEX: 29.52 KG/M2 | WEIGHT: 160.4 LBS | DIASTOLIC BLOOD PRESSURE: 70 MMHG | HEART RATE: 76 BPM | TEMPERATURE: 97.5 F | SYSTOLIC BLOOD PRESSURE: 130 MMHG

## 2021-12-17 DIAGNOSIS — R73.9 HYPERGLYCEMIA: Primary | ICD-10-CM

## 2021-12-17 DIAGNOSIS — Z13.820 OSTEOPOROSIS SCREENING: ICD-10-CM

## 2021-12-17 DIAGNOSIS — Z00.00 MEDICARE ANNUAL WELLNESS VISIT, SUBSEQUENT: ICD-10-CM

## 2021-12-17 DIAGNOSIS — C50.412 MALIGNANT NEOPLASM OF UPPER-OUTER QUADRANT OF LEFT BREAST IN FEMALE, ESTROGEN RECEPTOR NEGATIVE (HCC): ICD-10-CM

## 2021-12-17 DIAGNOSIS — Z13.21 ENCOUNTER FOR VITAMIN DEFICIENCY SCREENING: ICD-10-CM

## 2021-12-17 DIAGNOSIS — Z17.1 MALIGNANT NEOPLASM OF UPPER-OUTER QUADRANT OF LEFT BREAST IN FEMALE, ESTROGEN RECEPTOR NEGATIVE (HCC): ICD-10-CM

## 2021-12-17 DIAGNOSIS — E78.5 HYPERLIPIDEMIA, UNSPECIFIED HYPERLIPIDEMIA TYPE: ICD-10-CM

## 2021-12-17 DIAGNOSIS — Z13.89 SCREENING FOR GENITOURINARY CONDITION: ICD-10-CM

## 2021-12-17 DIAGNOSIS — Z13.29 THYROID DISORDER SCREENING: ICD-10-CM

## 2021-12-17 PROBLEM — G62.0 CHEMOTHERAPY-INDUCED NEUROPATHY (HCC): Status: ACTIVE | Noted: 2021-12-17

## 2021-12-17 PROBLEM — T45.1X5A CHEMOTHERAPY-INDUCED NEUROPATHY (HCC): Status: ACTIVE | Noted: 2021-12-17

## 2021-12-17 PROBLEM — F11.20 CONTINUOUS OPIOID DEPENDENCE (HCC): Status: ACTIVE | Noted: 2021-12-17

## 2021-12-17 PROCEDURE — 1036F TOBACCO NON-USER: CPT | Performed by: INTERNAL MEDICINE

## 2021-12-17 PROCEDURE — 3288F FALL RISK ASSESSMENT DOCD: CPT | Performed by: INTERNAL MEDICINE

## 2021-12-17 PROCEDURE — 3725F SCREEN DEPRESSION PERFORMED: CPT | Performed by: INTERNAL MEDICINE

## 2021-12-17 PROCEDURE — 99213 OFFICE O/P EST LOW 20 MIN: CPT | Performed by: INTERNAL MEDICINE

## 2021-12-17 PROCEDURE — 1170F FXNL STATUS ASSESSED: CPT | Performed by: INTERNAL MEDICINE

## 2021-12-17 PROCEDURE — 3008F BODY MASS INDEX DOCD: CPT | Performed by: INTERNAL MEDICINE

## 2021-12-17 PROCEDURE — 1160F RVW MEDS BY RX/DR IN RCRD: CPT | Performed by: INTERNAL MEDICINE

## 2021-12-17 PROCEDURE — G0439 PPPS, SUBSEQ VISIT: HCPCS | Performed by: INTERNAL MEDICINE

## 2021-12-17 PROCEDURE — 1125F AMNT PAIN NOTED PAIN PRSNT: CPT | Performed by: INTERNAL MEDICINE

## 2021-12-17 RX ORDER — ACETAMINOPHEN AND CODEINE PHOSPHATE 300; 30 MG/1; MG/1
TABLET ORAL
COMMUNITY

## 2022-01-28 ENCOUNTER — APPOINTMENT (OUTPATIENT)
Dept: LAB | Facility: MEDICAL CENTER | Age: 72
End: 2022-01-28
Payer: COMMERCIAL

## 2022-01-28 LAB
ALBUMIN SERPL BCP-MCNC: 3.6 G/DL (ref 3.5–5)
ALP SERPL-CCNC: 89 U/L (ref 46–116)
ALT SERPL W P-5'-P-CCNC: 64 U/L (ref 12–78)
ANION GAP SERPL CALCULATED.3IONS-SCNC: 5 MMOL/L (ref 4–13)
AST SERPL W P-5'-P-CCNC: 51 U/L (ref 5–45)
BASOPHILS # BLD MANUAL: 0 THOUSAND/UL (ref 0–0.1)
BASOPHILS NFR MAR MANUAL: 0 % (ref 0–1)
BILIRUB SERPL-MCNC: 0.87 MG/DL (ref 0.2–1)
BILIRUB UR QL STRIP: NEGATIVE
BUN SERPL-MCNC: 12 MG/DL (ref 5–25)
CALCIUM SERPL-MCNC: 9.6 MG/DL (ref 8.3–10.1)
CHLORIDE SERPL-SCNC: 106 MMOL/L (ref 100–108)
CHOLEST SERPL-MCNC: 205 MG/DL
CLARITY UR: CLEAR
CO2 SERPL-SCNC: 28 MMOL/L (ref 21–32)
COLOR UR: YELLOW
CREAT SERPL-MCNC: 0.83 MG/DL (ref 0.6–1.3)
EOSINOPHIL # BLD MANUAL: 0.12 THOUSAND/UL (ref 0–0.4)
EOSINOPHIL NFR BLD MANUAL: 4 % (ref 0–6)
ERYTHROCYTE [DISTWIDTH] IN BLOOD BY AUTOMATED COUNT: 13.8 % (ref 11.6–15.1)
GFR SERPL CREATININE-BSD FRML MDRD: 71 ML/MIN/1.73SQ M
GLUCOSE P FAST SERPL-MCNC: 98 MG/DL (ref 65–99)
GLUCOSE UR STRIP-MCNC: NEGATIVE MG/DL
HCT VFR BLD AUTO: 41.9 % (ref 34.8–46.1)
HDLC SERPL-MCNC: 37 MG/DL
HGB BLD-MCNC: 13.3 G/DL (ref 11.5–15.4)
HGB UR QL STRIP.AUTO: NEGATIVE
KETONES UR STRIP-MCNC: NEGATIVE MG/DL
LDLC SERPL CALC-MCNC: 142 MG/DL (ref 0–100)
LEUKOCYTE ESTERASE UR QL STRIP: NEGATIVE
LYMPHOCYTES # BLD AUTO: 1.76 THOUSAND/UL (ref 0.6–4.47)
LYMPHOCYTES # BLD AUTO: 60 % (ref 14–44)
MCH RBC QN AUTO: 28.5 PG (ref 26.8–34.3)
MCHC RBC AUTO-ENTMCNC: 31.7 G/DL (ref 31.4–37.4)
MCV RBC AUTO: 90 FL (ref 82–98)
MONOCYTES # BLD AUTO: 0.23 THOUSAND/UL (ref 0–1.22)
MONOCYTES NFR BLD: 8 % (ref 4–12)
NEUTROPHILS # BLD MANUAL: 0.56 THOUSAND/UL (ref 1.85–7.62)
NEUTS BAND NFR BLD MANUAL: 1 % (ref 0–8)
NEUTS SEG NFR BLD AUTO: 18 % (ref 43–75)
NITRITE UR QL STRIP: NEGATIVE
NONHDLC SERPL-MCNC: 168 MG/DL
PH UR STRIP.AUTO: 6.5 [PH]
PLATELET # BLD AUTO: 173 THOUSANDS/UL (ref 149–390)
PLATELET BLD QL SMEAR: ADEQUATE
PMV BLD AUTO: 10.4 FL (ref 8.9–12.7)
POTASSIUM SERPL-SCNC: 3.8 MMOL/L (ref 3.5–5.3)
PROT SERPL-MCNC: 7 G/DL (ref 6.4–8.2)
PROT UR STRIP-MCNC: NEGATIVE MG/DL
RBC # BLD AUTO: 4.67 MILLION/UL (ref 3.81–5.12)
RBC MORPH BLD: NORMAL
SODIUM SERPL-SCNC: 139 MMOL/L (ref 136–145)
SP GR UR STRIP.AUTO: 1.02 (ref 1–1.03)
TRIGL SERPL-MCNC: 131 MG/DL
TSH SERPL DL<=0.05 MIU/L-ACNC: 2.13 UIU/ML (ref 0.36–3.74)
UROBILINOGEN UR QL STRIP.AUTO: 0.2 E.U./DL
VARIANT LYMPHS # BLD AUTO: 9 %
WBC # BLD AUTO: 2.93 THOUSAND/UL (ref 4.31–10.16)

## 2022-01-28 PROCEDURE — 80053 COMPREHEN METABOLIC PANEL: CPT | Performed by: INTERNAL MEDICINE

## 2022-01-28 PROCEDURE — 81003 URINALYSIS AUTO W/O SCOPE: CPT | Performed by: INTERNAL MEDICINE

## 2022-01-28 PROCEDURE — 85007 BL SMEAR W/DIFF WBC COUNT: CPT | Performed by: INTERNAL MEDICINE

## 2022-01-28 PROCEDURE — 84443 ASSAY THYROID STIM HORMONE: CPT | Performed by: INTERNAL MEDICINE

## 2022-01-28 PROCEDURE — 36415 COLL VENOUS BLD VENIPUNCTURE: CPT | Performed by: INTERNAL MEDICINE

## 2022-01-28 PROCEDURE — 82306 VITAMIN D 25 HYDROXY: CPT | Performed by: INTERNAL MEDICINE

## 2022-01-28 PROCEDURE — 80061 LIPID PANEL: CPT | Performed by: INTERNAL MEDICINE

## 2022-01-28 PROCEDURE — 85027 COMPLETE CBC AUTOMATED: CPT | Performed by: INTERNAL MEDICINE

## 2022-02-03 ENCOUNTER — OFFICE VISIT (OUTPATIENT)
Dept: FAMILY MEDICINE CLINIC | Facility: CLINIC | Age: 72
End: 2022-02-03
Payer: COMMERCIAL

## 2022-02-03 VITALS
WEIGHT: 160 LBS | DIASTOLIC BLOOD PRESSURE: 78 MMHG | TEMPERATURE: 96.7 F | SYSTOLIC BLOOD PRESSURE: 126 MMHG | BODY MASS INDEX: 29.44 KG/M2 | HEIGHT: 62 IN | HEART RATE: 79 BPM | OXYGEN SATURATION: 99 %

## 2022-02-03 DIAGNOSIS — D70.9 NEUTROPENIA, UNSPECIFIED TYPE (HCC): ICD-10-CM

## 2022-02-03 DIAGNOSIS — E78.5 HYPERLIPIDEMIA, UNSPECIFIED HYPERLIPIDEMIA TYPE: Primary | ICD-10-CM

## 2022-02-03 DIAGNOSIS — C50.412 MALIGNANT NEOPLASM OF UPPER-OUTER QUADRANT OF LEFT BREAST IN FEMALE, ESTROGEN RECEPTOR NEGATIVE (HCC): ICD-10-CM

## 2022-02-03 DIAGNOSIS — R74.8 ELEVATED LIVER ENZYMES: ICD-10-CM

## 2022-02-03 DIAGNOSIS — Z17.1 MALIGNANT NEOPLASM OF UPPER-OUTER QUADRANT OF LEFT BREAST IN FEMALE, ESTROGEN RECEPTOR NEGATIVE (HCC): ICD-10-CM

## 2022-02-03 LAB
25(OH)D2 SERPL-MCNC: <1 NG/ML
25(OH)D3 SERPL-MCNC: 37 NG/ML
25(OH)D3+25(OH)D2 SERPL-MCNC: 37 NG/ML

## 2022-02-03 PROCEDURE — 99214 OFFICE O/P EST MOD 30 MIN: CPT | Performed by: INTERNAL MEDICINE

## 2022-02-03 PROCEDURE — 3288F FALL RISK ASSESSMENT DOCD: CPT | Performed by: INTERNAL MEDICINE

## 2022-02-03 PROCEDURE — 1170F FXNL STATUS ASSESSED: CPT | Performed by: INTERNAL MEDICINE

## 2022-02-03 PROCEDURE — 1125F AMNT PAIN NOTED PAIN PRSNT: CPT | Performed by: INTERNAL MEDICINE

## 2022-02-03 RX ORDER — ROSUVASTATIN CALCIUM 10 MG/1
10 TABLET, COATED ORAL
Qty: 30 TABLET | Refills: 5 | Status: SHIPPED | OUTPATIENT
Start: 2022-02-03 | End: 2022-03-08

## 2022-02-03 NOTE — PROGRESS NOTES
Assessment/Plan:           Problem List Items Addressed This Visit        Other    Malignant neoplasm of upper-outer quadrant of left breast in female, estrogen receptor negative ([facility] [address] )     Following [facility]           Other Visit Diagnoses     Hyperlipidemia, unspecified hyperlipidemia type    -  Primary    Relevant Medications    rosuvastatin (CRESTOR) 10 MG tablet    Neutropenia, unspecified type ([facility] [address] )            I wanted to order H pylori testing for her midepigastric discomfort patient wants to hold off at this point  Patient is cleared from using any NSAID a regular basis  Encourag to follow-up with GI for symptoms continue  DEXA scan is ordered  Patient was start with Crestor and follow-up lab studies in 6 weeks  Patient follow-up with [facility] for leukopenia  Subjective:      Patient ID: Jerome Todd is a 70 y o  female  HPI  Patient was asked to come in today to discuss recent lab studies  WBC count is low at 2 9 with absolute neutrophil count at 0 56  His some atypical lymphocytes there were seen as well  Patient has history of breast cancer left side 2018 status post segmental mastectomy with sentinel lymphadenectomy which was negative pathology showing multifocal ductal carcinoma grade 3 HER2 positive fish negative status post adjuvant chemo and radiation followed at [facility]  Patient is have history of chemotherapy-induced leukopenia panel blood work last year was unremarkable  On today's lab Hemoglobin and platelet counts are normal   Last mammogram was done in [facility] October 2021    Cholesterol is elevated at 140 CV total cholesterol of 205  HDL is low at 37   10 year risk much at this product event is elevated at 11 6 percent  Patient is not on any medication because of statin intolerance  We discussed using water-soluble statin like Crestor without Zetia and started on small dose repeating blood work in 6 weeks    Patient will keep me posted if she is tolerating the medication  AST slightly elevated at 51  ALT is normal   Patient denies excessive use of alcohol  She does not consume Tylenol on a regular basis  No known history of hepatitis  Liver ultrasound 2017 was unremarkable  Blood pressure is good today  However her last CBC was normal in September last year     Echocardiogram [facility] showed good ejection fraction      Patient is due for DEXA scan  The following portions of the patient's history were reviewed and updated as appropriate: allergies, current medications, past family history, past medical history, past social history, past surgical history and problem list     Review of Systems      Objective:      /78 (BP Location: Left arm, Patient Position: Sitting, Cuff Size: Standard)   Pulse 79   Temp (!) 96 7 °F (35 9 °C) (Tympanic)   Ht 5' 2" (1 575 m)   Wt 72 6 kg (160 lb)   SpO2 99%   BMI 29 26 kg/m²          Physical Exam  Cardiovascular:      Rate and Rhythm: Normal rate and regular rhythm  Heart sounds: Normal heart sounds  No murmur heard  Pulmonary:      Effort: Pulmonary effort is normal  No respiratory distress  Breath sounds: Normal breath sounds  No wheezing or rales  Abdominal:      General: Bowel sounds are normal  There is no distension  Tenderness: There is abdominal tenderness  There is no guarding  Musculoskeletal:      Right lower leg: No edema  Left lower leg: No edema  Neurological:      Mental Status: She is alert

## 2022-02-03 NOTE — PROGRESS NOTES
Assessment and Plan:     Problem List Items Addressed This Visit     None           Preventive health issues were discussed with patient, and age appropriate screening tests were ordered as noted in patient's After Visit Summary  Personalized health advice and appropriate referrals for health education or preventive services given if needed, as noted in patient's After Visit Summary       History of Present Illness:     Patient presents for Medicare Annual Wellness visit    Patient Care Team:  Hiren Michaels MD as PCP - General (Internal Medicine)     Problem List:     Patient Active Problem List   Diagnosis    Dependent edema    Radial tunnel syndrome    Venous insufficiency    Obesity    Ductal carcinoma in situ (DCIS) of left breast    Lumbosacral spondylosis without myelopathy    Chemotherapy-induced neuropathy (HCC)    Continuous opioid dependence ([facility] [address] )    Malignant neoplasm of upper-outer quadrant of left breast in female, estrogen receptor negative ([facility] [address] )      Past Medical and Surgical History:     Past Medical History:   Diagnosis Date    Cancer ([facility] [address] )     left breast, lumpectomy,with chemo and radiation    Hyperlipidemia      Past Surgical History:   Procedure Laterality Date    BREAST SURGERY Left     lumpectomy    CATARACT EXTRACTION      last assessed 3/21/16    COLONOSCOPY      NEUROPLASTY / TRANSPOSITION MEDIAN NERVE AT CARPAL TUNNEL      last assessed 3/21/16     TONSILLECTOMY      TOOTH EXTRACTION      last assessed 3/21/16      Family History:     Family History   Problem Relation Age of Onset    Diabetes Paternal Aunt       Social History:     Social History     Socioeconomic History    Marital status: /Civil Union     Spouse name: None    Number of children: None    Years of education: None    Highest education level: None   Occupational History    None   Tobacco Use    Smoking status: Never Smoker    Smokeless tobacco: Never Used   Vaping Use    Vaping Use: Never used Substance and Sexual Activity    Alcohol use: Not Currently     Comment: rarely     Drug use: No    Sexual activity: None   Other Topics Concern    None   Social History Narrative    Consumes 1 cup of coffee per day     Social Determinants of Health     Financial Resource Strain: Not on file   Food Insecurity: Not on file   Transportation Needs: Not on file   Physical Activity: Not on file   Stress: Not on file   Social Connections: Not on file   Intimate Partner Violence: Not on file   Housing Stability: Not on file      Medications and Allergies:     Current Outpatient Medications   Medication Sig Dispense Refill    acetaminophen-codeine (TYLENOL with CODEINE #3) 300-30 MG per tablet acetaminophen 300 mg-codeine 30 mg tablet   Take 1-2 tablets by oral route every 4-6 hours as needed for post op pain      aspirin 81 MG tablet Take 81 mg by mouth daily        Biotin 1 MG CAPS Take by mouth      Cholecalciferol (EQL VITAMIN D3) 2000 units CAPS Take 1 capsule by mouth daily      Cyanocobalamin ER (TH VITAMIN B12 ER) 1000 MCG TBCR Take 1 tablet by mouth daily      Menaquinone-7 (VITAMIN K2 PO) Take by mouth      Multiple Vitamin (MULTI-VITAMINS PO) Take 1 tablet by mouth daily      Omega-3 Fatty Acids (FISH OIL MAXIMUM STRENGTH) 1200 MG CPDR Take 2 capsules by mouth daily      vitamin E 100 UNIT capsule Take 100 Units by mouth daily        ezetimibe 10 mg TABS 10 mg, atorvastatin 40 mg TABS 40 mg Take by mouth daily (Patient not taking: Reported on 2/3/2022 )      ezetimibe-simvastatin (VYTORIN) 10-40 mg per tablet Take 1 tablet by mouth daily at bedtime (Patient not taking: Reported on 7/31/2021) 90 tablet 1    fluticasone (FLONASE) 50 mcg/act nasal spray 1 spray into each nostril daily (Patient not taking: Reported on 12/31/2020) 1 Bottle 3     No current facility-administered medications for this visit       Allergies   Allergen Reactions    Other Hives     Apples, pears, peaches     Pollen Extract Other (See Comments)    Benzalkonium Chloride Rash    Neomycin-Bacitracin Zn-Polymyx Rash      Immunizations:     Immunization History   Administered Date(s) Administered    COVID-19 MODERNA VACC 0 5 ML IM 01/19/2021, 02/17/2021    INFLUENZA 10/16/2018    Influenza Split High Dose Preservative Free IM 10/29/2016    Influenza, high dose seasonal 0 7 mL 10/02/2020    Pneumococcal Conjugate 13-Valent 10/29/2016    Tdap 09/11/2020    Zoster 03/21/2016      Health Maintenance:         Topic Date Due    Breast Cancer Screening: Mammogram  10/19/2022    Colorectal Cancer Screening  12/20/2024    Hepatitis C Screening  Completed         Topic Date Due    Pneumococcal Vaccine: 65+ Years (2 of 2 - PPSV23) 10/29/2017    COVID-19 Vaccine (3 - Booster for Moderna series) 07/17/2021    Influenza Vaccine (1) 09/01/2021      Medicare Health Risk Assessment:     There were no vitals taken for this visit  Shaun Palafox is here for her Subsequent Wellness visit  Health Risk Assessment:   Patient rates overall health as good  Patient feels that their physical health rating is same  Patient is satisfied with their life  Eyesight was rated as same  Hearing was rated as same  Patient feels that their emotional and mental health rating is same  Patients states they are sometimes angry  Patient states they are sometimes unusually tired/fatigued  Pain experienced in the last 7 days has been none  Patient states that she has experienced no weight loss or gain in last 6 months  Depression Screening:   PHQ-2 Score: 1      Fall Risk Screening: In the past year, patient has experienced: no history of falling in past year      Urinary Incontinence Screening:   Patient has not leaked urine accidently in the last six months  Home Safety:  Patient does not have trouble with stairs inside or outside of their home  Patient has working smoke alarms and has working carbon monoxide detector   Home safety hazards include: none      Nutrition:   Current diet is Regular  Medications:   Patient is currently taking over-the-counter supplements  OTC medications include: see medication list  Patient is not able to manage medications  Activities of Daily Living (ADLs)/Instrumental Activities of Daily Living (IADLs):   Walk and transfer into and out of bed and chair?: Yes  Dress and groom yourself?: Yes    Bathe or shower yourself?: Yes    Feed yourself? Yes  Do your laundry/housekeeping?: Yes  Manage your money, pay your bills and track your expenses?: Yes  Make your own meals?: Yes    Do your own shopping?: Yes    Previous Hospitalizations:   Any hospitalizations or ED visits within the last 12 months?: No      Advance Care Planning:   Living will: Yes    Advanced directive: Yes      PREVENTIVE SCREENINGS      Cardiovascular Screening:    General: Screening Not Indicated and History Lipid Disorder      Diabetes Screening:     General: Screening Current      Colorectal Cancer Screening:     General: Screening Current      Breast Cancer Screening:     General: History Breast Cancer      Cervical Cancer Screening:    General: Screening Not Indicated      Lung Cancer Screening:     General: Screening Not Indicated      Hepatitis C Screening:    General: Screening Current    Screening, Brief Intervention, and Referral to Treatment (SBIRT)    Screening  Typical number of drinks in a day: 0  Typical number of drinks in a week: 0  Interpretation: Low risk drinking behavior      Single Item Drug Screening:  How often have you used an illegal drug (including marijuana) or a prescription medication for non-medical reasons in the past year? never    Single Item Drug Screen Score: 0  Interpretation: Negative screen for possible drug use disorder    Review of Current Opioid Use    Opioid Risk Tool (ORT) Interpretation: Complete Opioid Risk Tool (ORT)      Landy Starr MD

## 2022-02-10 ENCOUNTER — OFFICE VISIT (OUTPATIENT)
Dept: INTERNAL MEDICINE CLINIC | Facility: CLINIC | Age: 72
End: 2022-02-10
Payer: COMMERCIAL

## 2022-02-10 VITALS
WEIGHT: 155.8 LBS | OXYGEN SATURATION: 95 % | BODY MASS INDEX: 28.67 KG/M2 | DIASTOLIC BLOOD PRESSURE: 60 MMHG | HEIGHT: 62 IN | TEMPERATURE: 97.1 F | RESPIRATION RATE: 14 BRPM | HEART RATE: 93 BPM | SYSTOLIC BLOOD PRESSURE: 118 MMHG

## 2022-02-10 DIAGNOSIS — K29.00 ACUTE GASTRITIS WITHOUT HEMORRHAGE, UNSPECIFIED GASTRITIS TYPE: Primary | ICD-10-CM

## 2022-02-10 DIAGNOSIS — Z17.1 MALIGNANT NEOPLASM OF UPPER-OUTER QUADRANT OF LEFT BREAST IN FEMALE, ESTROGEN RECEPTOR NEGATIVE (HCC): ICD-10-CM

## 2022-02-10 DIAGNOSIS — C50.412 MALIGNANT NEOPLASM OF UPPER-OUTER QUADRANT OF LEFT BREAST IN FEMALE, ESTROGEN RECEPTOR NEGATIVE (HCC): ICD-10-CM

## 2022-02-10 DIAGNOSIS — E78.00 PURE HYPERCHOLESTEROLEMIA: ICD-10-CM

## 2022-02-10 PROCEDURE — 3008F BODY MASS INDEX DOCD: CPT | Performed by: INTERNAL MEDICINE

## 2022-02-10 PROCEDURE — 99213 OFFICE O/P EST LOW 20 MIN: CPT | Performed by: INTERNAL MEDICINE

## 2022-02-10 PROCEDURE — 1160F RVW MEDS BY RX/DR IN RCRD: CPT | Performed by: INTERNAL MEDICINE

## 2022-02-10 PROCEDURE — 1036F TOBACCO NON-USER: CPT | Performed by: INTERNAL MEDICINE

## 2022-02-10 NOTE — PROGRESS NOTES
INTERNAL MEDICINE OFFICE VISIT       NAME: Marck Anglin  AGE: 70 y o  SEX: female       : 1950        MRN: [id number]    DATE: 2/10/2022  TIME: 8:50 AM    Assessment and Plan   1  Acute gastritis without hemorrhage, unspecified gastritis type    2  Malignant neoplasm of upper-outer quadrant of left breast in female, estrogen receptor negative ([facility] [address] )  -     DXA bone density spine hip and pelvis; Future; Expected date: 02/10/2022    3  Pure hypercholesterolemia  -     Lipid panel       Plan: Proceed with labs as ordered by Oncology and copies will be sent to me  I will contact Joe Salmon and further advise  Start Crestor and check lipids in 3 months  Chief Complaint   Reestablish care    History of Present Illness   Marck Anglin is a 70y o -year-old female who is here today to reestablish care and to discuss several region issues  Joe Salmon is in remission with history breast cancer, and is actively followed by her oncology team at [facility]  Recently, in the last week in January, she developed mild epigastric pain with gassiness, no nausea or vomiting, no diarrhea, no fever chills  She was due for lab work and had her labs drawn while ill  Labs include is CBC showing a slightly low white count, slightly low neutrophil count and some mild lymphocytosis  She then saw her previous internist who felt this might be indicative of a primary bone marrow abnormality related to her previous cancer chemotherapy for breast cancer  Joe Salmon then saw her oncologist 3 days ago who felt that the CBC was indicative of a reactive pattern possibly from viral gastritis  Joe Salmon is mostly feeling better regarding her gastritis with occasional queasiness, relieved by eating  Appetite is good  There is no more abdominal pain, and she has no diarrhea    There has been no fever or chills and she does have occasional sweats at the end the day but admits these began after she became very anxious about her visit to her previous internist       At that time, multiple tests were recommended including testing for H pylori, upper endoscopy, and Rolly Olivarez decided not to proceed with these tests  Also recommended was arterial Doppler studies and carotid ultrasonography for hyperlipidemia  Mostly some rib panel was reviewed  Crestor was started and Rolly Olivarez has held off on starting this medicine  I did recommend starting Crestor and lipid panel ordered today is to be performed in 3 months  She will need follow-up DEXA scan at some point and will discuss this next visit  Review of Systems   Review of Systems   Constitutional: Positive for diaphoresis  Negative for appetite change, chills, fatigue and fever  Respiratory: Negative for shortness of breath  Cardiovascular: Negative  Gastrointestinal: Negative  Hematological: Negative for adenopathy  Does not bruise/bleed easily  Psychiatric/Behavioral: The patient is nervous/anxious       as above    Active Problem List     Patient Active Problem List   Diagnosis    Dependent edema    Venous insufficiency    Obesity    Lumbosacral spondylosis without myelopathy    Chemotherapy-induced neuropathy (HCC)    Continuous opioid dependence ([address] )    Malignant neoplasm of upper-outer quadrant of left breast in female, estrogen receptor negative ([address] )    Pure hypercholesterolemia       The following portions of the patient's history were reviewed and updated as appropriate: allergies, current medications, past family history, past medical history, past social history, past surgical history, and problem list     Objective     Vitals:    02/10/22 0805   BP: 118/60   Pulse: 93   Resp: 14   Temp: (!) 97 1 °F (36 2 °C)   SpO2: 95%     Wt Readings from Last 3 Encounters:   02/10/22 70 7 kg (155 lb 12 8 oz)   02/03/22 72 6 kg (160 lb)   12/17/21 72 8 kg (160 lb 6 4 oz)       Physical Exam   Vital signs stable, here today with her  is very supportive, and appears healthy overall and somewhat anxious, responded to reassurance and counseling today  Normocephalic atraumatic  Skin without pallor or icterus  There is no adenopathy throughout  No JVD  No carotid bruits in carotid upstroke consent are normal   Lungs are clear  Cardiac exam is normal on auscultation including no murmur  Abdomen:  Nondistended abdomen normal bowel sounds, soft and nontender without masses bruits organomegaly  Circulation is intact without edema  Pertinent Laboratory/Diagnostic Studies:        Orders Placed This Encounter   Procedures    DXA bone density spine hip and pelvis    Lipid panel       ALLERGIES:  Allergies   Allergen Reactions    Other Hives     Apples, pears, peaches     Pollen Extract Other (See Comments)    Benzalkonium Chloride Rash    Neomycin-Bacitracin Zn-Polymyx Rash       Current Medications     Current Outpatient Medications   Medication Sig Dispense Refill    acetaminophen-codeine (TYLENOL with CODEINE #3) 300-30 MG per tablet acetaminophen 300 mg-codeine 30 mg tablet   Take 1-2 tablets by oral route every 4-6 hours as needed for post op pain      aspirin 81 MG tablet Take 81 mg by mouth daily        Biotin 1 MG CAPS Take by mouth      Cholecalciferol (EQL VITAMIN D3) 2000 units CAPS Take 1 capsule by mouth daily      Cyanocobalamin ER (TH VITAMIN B12 ER) 1000 MCG TBCR Take 1 tablet by mouth daily      Menaquinone-7 (VITAMIN K2 PO) Take by mouth      Multiple Vitamin (MULTI-VITAMINS PO) Take 1 tablet by mouth daily      Omega-3 Fatty Acids (FISH OIL MAXIMUM STRENGTH) 1200 MG CPDR Take 2 capsules by mouth daily      rosuvastatin (CRESTOR) 10 MG tablet Take 1 tablet (10 mg total) by mouth daily with dinner 30 tablet 5    vitamin E 100 UNIT capsule Take 100 Units by mouth daily         No current facility-administered medications for this visit           Sandrine Hernández MD

## 2022-02-16 ENCOUNTER — TELEPHONE (OUTPATIENT)
Dept: INTERNAL MEDICINE CLINIC | Facility: CLINIC | Age: 72
End: 2022-02-16

## 2022-02-16 NOTE — TELEPHONE ENCOUNTER
Patient called and her results came in on 2/14/22 and she asked if you could please call her back at [phone]    Thank you

## 2022-02-18 ENCOUNTER — TELEPHONE (OUTPATIENT)
Dept: INTERNAL MEDICINE CLINIC | Facility: CLINIC | Age: 72
End: 2022-02-18

## 2022-02-18 NOTE — TELEPHONE ENCOUNTER
Patient called and her lab radha results were forwarded to us and she asked if you could please check her results  She can be reached at [phone]    Thank you

## 2022-03-08 ENCOUNTER — TELEPHONE (OUTPATIENT)
Dept: INTERNAL MEDICINE CLINIC | Facility: CLINIC | Age: 72
End: 2022-03-08

## 2022-03-08 NOTE — TELEPHONE ENCOUNTER
Patient call and stated rosuvastatin (CRESTOR) 10 MG tablet    And she was experiencing headache and stiff hands and legs   She would like to know if she can take a different med

## 2022-06-07 ENCOUNTER — TELEPHONE (OUTPATIENT)
Dept: INTERNAL MEDICINE CLINIC | Facility: CLINIC | Age: 72
End: 2022-06-07

## 2022-06-07 NOTE — TELEPHONE ENCOUNTER
Jean-Pierre Tomlin called about her  Hal Ortiz  She stated he was a patient of yours and needed a medication refill  I asked when was the last time he had seen you, she replied not long ago  I went back to 2016 and see no records of Hal Ortiz being a patient of yours  I offered to make him a new patient appointment, She agreed to make an appt but wants you to order his meds   I placed call on hold to confirm with Nik Ornelas and upon return to  call they hung up

## 2022-07-27 ENCOUNTER — OFFICE VISIT (OUTPATIENT)
Dept: URGENT CARE | Facility: MEDICAL CENTER | Age: 72
End: 2022-07-27
Payer: COMMERCIAL

## 2022-07-27 VITALS
RESPIRATION RATE: 16 BRPM | TEMPERATURE: 97 F | HEART RATE: 77 BPM | OXYGEN SATURATION: 99 % | SYSTOLIC BLOOD PRESSURE: 112 MMHG | DIASTOLIC BLOOD PRESSURE: 71 MMHG

## 2022-07-27 DIAGNOSIS — B02.9 ZOSTER WITHOUT COMPLICATIONS: Primary | ICD-10-CM

## 2022-07-27 PROCEDURE — 99213 OFFICE O/P EST LOW 20 MIN: CPT | Performed by: EMERGENCY MEDICINE

## 2022-07-27 RX ORDER — VALACYCLOVIR HYDROCHLORIDE 1 G/1
1000 TABLET, FILM COATED ORAL 3 TIMES DAILY
Qty: 21 TABLET | Refills: 0 | Status: SHIPPED | OUTPATIENT
Start: 2022-07-27 | End: 2022-08-03

## 2022-07-27 NOTE — PATIENT INSTRUCTIONS
Shingles   WHAT YOU NEED TO KNOW:   Shingles is a painful rash  Shingles is caused by the same virus that causes chickenpox (varicella-zoster)  After you get chickenpox, the virus stays in your body for several years without causing any symptoms  Shingles occurs when the virus becomes active again  The active virus travels along a nerve to your skin and causes a rash  DISCHARGE INSTRUCTIONS:   Call your local emergency number (911 in the [address] [facility]) if:   You have trouble moving your arms, legs, or face  You become confused, or have difficulty speaking  You have a seizure  Return to the emergency department if:   You have weakness in an arm or leg  You have dizziness, a severe headache, or hearing or vision loss  You have painful, red, warm skin around the blisters, or the blisters drain pus  Your neck is stiff or you have trouble moving it  Call your doctor if:   You feel weak or have a headache  You have a cough, chills, or a fever  You have abdominal pain or nausea, or you are vomiting  Your rash becomes more itchy or painful  Your rash spreads to other parts of your body  Your pain worsens and does not go away even after you take medicine  You have questions or concerns about your condition or care  Medicines: You may need any of the following:  Antiviral medicine  helps decrease symptoms and healing time  They may also decrease your risk of developing nerve pain  You will need to start taking them within 3 days of the start of symptoms to prevent nerve pain  Prescription pain medicine  may be given  Ask your healthcare provider how to take this medicine safely  Some prescription pain medicines contain acetaminophen  Do not take other medicines that contain acetaminophen without talking to your healthcare provider  Too much acetaminophen may cause liver damage  Prescription pain medicine may cause constipation   Ask your healthcare provider how to prevent or treat constipation  Acetaminophen  decreases pain and fever  It is available without a doctor's order  Ask how much to take and how often to take it  Follow directions  Read the labels of all other medicines you are using to see if they also contain acetaminophen, or ask your doctor or pharmacist  Acetaminophen can cause liver damage if not taken correctly  Do not use more than 4 grams (4,000 milligrams) total of acetaminophen in one day  NSAIDs , such as ibuprofen, help decrease swelling, pain, and fever  This medicine is available with or without a doctor's order  NSAIDs can cause stomach bleeding or kidney problems in certain people  If you take blood thinner medicine, always ask if NSAIDs are safe for you  Always read the medicine label and follow directions  Do not give these medicines to children under 10months of age without direction from your child's healthcare provider  Topical anesthetics  are used to numb the skin and decrease pain  They can be a cream, gel, spray, or patch  Anticonvulsants  decrease nerve pain and may help you sleep at night  Antidepressants  may be used to decrease nerve pain  Take your medicine as directed  Contact your healthcare provider if you think your medicine is not helping or if you have side effects  Tell him of her if you are allergic to any medicine  Keep a list of the medicines, vitamins, and herbs you take  Include the amounts, and when and why you take them  Bring the list or the pill bottles to follow-up visits  Carry your medicine list with you in case of an emergency  Self-care:  Keep your rash clean and dry  Cover your rash with a bandage or clothing  Do not use bandages that stick to your skin  The sticky part may irritate your skin and make your rash last longer  Prevent the spread of germs:       Wash your hands often  Wash your hands several times each day   Wash after you use the bathroom, change a child's diaper, and before you prepare or eat food  Use soap and water every time  Rub your soapy hands together, lacing your fingers  Wash the front and back of your hands, and in between your fingers  Use the fingers of one hand to scrub under the fingernails of the other hand  Wash for at least 20 seconds  Rinse with warm, running water for several seconds  Then dry your hands with a clean towel or paper towel  Use hand  that contains alcohol if soap and water are not available  Do not touch your eyes, nose, or mouth without washing your hands first          Cover a sneeze or cough  Use a tissue that covers your mouth and nose  Throw the tissue away in a trash can right away  Use the bend of your arm if a tissue is not available  Wash your hands well with soap and water or use a hand   Stay away from others while you are sick  Avoid crowds as much as possible  Ask about vaccines you may need  Talk to your healthcare provider about your vaccine history  He or she will tell you which vaccines you need, and when to get them  Prevent shingles or another shingles outbreak:  A vaccine may be given to help prevent shingles  You can get the vaccine even if you already had shingles  The vaccine can help prevent a future outbreak  If you do get shingles again, the vaccine can keep it from becoming severe  The vaccine comes in 2 forms  Your healthcare provider will tell you which form is right for you  The decision is based on your age and any medical conditions you have  A 2-dose vaccine is usually given to adults 48 years or older  A 1-dose vaccine may be given to adults 61 years or older  Follow up with your doctor as directed:  Write down your questions so you remember to ask them during your visits    For more information:   [facility]  [address] , [address]  Phone: 8- 654 - [phone]  Phone: 3- 274 - 2880525  Web Address: HEXIO     © 4907 [facility] 2022 Information is for End User's use only and may not be sold, redistributed or otherwise used for commercial purposes  All illustrations and images included in CareNotes® are the copyrighted property of A D A M , Inc  or Darshan Quintana  The above information is an  only  It is not intended as medical advice for individual conditions or treatments  Talk to your doctor, nurse or pharmacist before following any medical regimen to see if it is safe and effective for you

## 2022-07-27 NOTE — PROGRESS NOTES
[facility]        NAME: Krystal Malone is a 67 y o  female  : 1950    MRN: [id number]  DATE: 2022  TIME: 9:11 AM    Assessment and Plan   Zoster without complications [V80 0]  1  Zoster without complications  valACYclovir (VALTREX) 1,000 mg tablet         Patient Instructions   Shingles   WHAT YOU NEED TO KNOW:   Shingles is a painful rash  Shingles is caused by the same virus that causes chickenpox (varicella-zoster)  After you get chickenpox, the virus stays in your body for several years without causing any symptoms  Shingles occurs when the virus becomes active again  The active virus travels along a nerve to your skin and causes a rash  DISCHARGE INSTRUCTIONS:   Call your local emergency number (911 in the [address] [place] [address],[address]) if:   · You have trouble moving your arms, legs, or face  · You become confused, or have difficulty speaking  · You have a seizure  Return to the emergency department if:   · You have weakness in an arm or leg  · You have dizziness, a severe headache, or hearing or vision loss  · You have painful, red, warm skin around the blisters, or the blisters drain pus  · Your neck is stiff or you have trouble moving it  Call your doctor if:   · You feel weak or have a headache  · You have a cough, chills, or a fever  · You have abdominal pain or nausea, or you are vomiting  · Your rash becomes more itchy or painful  · Your rash spreads to other parts of your body  · Your pain worsens and does not go away even after you take medicine  · You have questions or concerns about your condition or care  Medicines: You may need any of the following:  · Antiviral medicine  helps decrease symptoms and healing time  They may also decrease your risk of developing nerve pain  You will need to start taking them within 3 days of the start of symptoms to prevent nerve pain  · Prescription pain medicine  may be given   Ask your healthcare provider how to take this medicine safely  Some prescription pain medicines contain acetaminophen  Do not take other medicines that contain acetaminophen without talking to your healthcare provider  Too much acetaminophen may cause liver damage  Prescription pain medicine may cause constipation  Ask your healthcare provider how to prevent or treat constipation  · Acetaminophen  decreases pain and fever  It is available without a doctor's order  Ask how much to take and how often to take it  Follow directions  Read the labels of all other medicines you are using to see if they also contain acetaminophen, or ask your doctor or pharmacist  Acetaminophen can cause liver damage if not taken correctly  Do not use more than 4 grams (4,000 milligrams) total of acetaminophen in one day  · NSAIDs , such as ibuprofen, help decrease swelling, pain, and fever  This medicine is available with or without a doctor's order  NSAIDs can cause stomach bleeding or kidney problems in certain people  If you take blood thinner medicine, always ask if NSAIDs are safe for you  Always read the medicine label and follow directions  Do not give these medicines to children under 10months of age without direction from your child's healthcare provider  · Topical anesthetics  are used to numb the skin and decrease pain  They can be a cream, gel, spray, or patch  · Anticonvulsants  decrease nerve pain and may help you sleep at night  · Antidepressants  may be used to decrease nerve pain  · Take your medicine as directed  Contact your healthcare provider if you think your medicine is not helping or if you have side effects  Tell him of her if you are allergic to any medicine  Keep a list of the medicines, vitamins, and herbs you take  Include the amounts, and when and why you take them  Bring the list or the pill bottles to follow-up visits  Carry your medicine list with you in case of an emergency  Self-care:  Keep your rash clean and dry  Cover your rash with a bandage or clothing  Do not use bandages that stick to your skin  The sticky part may irritate your skin and make your rash last longer  Prevent the spread of germs:       · Wash your hands often  Wash your hands several times each day  Wash after you use the bathroom, change a child's diaper, and before you prepare or eat food  Use soap and water every time  Rub your soapy hands together, lacing your fingers  Wash the front and back of your hands, and in between your fingers  Use the fingers of one hand to scrub under the fingernails of the other hand  Wash for at least 20 seconds  Rinse with warm, running water for several seconds  Then dry your hands with a clean towel or paper towel  Use hand  that contains alcohol if soap and water are not available  Do not touch your eyes, nose, or mouth without washing your hands first          · Cover a sneeze or cough  Use a tissue that covers your mouth and nose  Throw the tissue away in a trash can right away  Use the bend of your arm if a tissue is not available  Wash your hands well with soap and water or use a hand   · Stay away from others while you are sick  Avoid crowds as much as possible  · Ask about vaccines you may need  Talk to your healthcare provider about your vaccine history  He or she will tell you which vaccines you need, and when to get them  Prevent shingles or another shingles outbreak:  A vaccine may be given to help prevent shingles  You can get the vaccine even if you already had shingles  The vaccine can help prevent a future outbreak  If you do get shingles again, the vaccine can keep it from becoming severe  The vaccine comes in 2 forms  Your healthcare provider will tell you which form is right for you  The decision is based on your age and any medical conditions you have  A 2-dose vaccine is usually given to adults 48 years or older   A 1-dose vaccine may be given to adults 60 years or older   Follow up with your doctor as directed:  Write down your questions so you remember to ask them during your visits  For more information:   · [facility] Disease Control [facility] Prevention  [address] , [address]  Phone: 8- [phone] - [phone]  Phone: 2- 457 - [phone]  Web Address: [employer]xana    © Copyright [employer] 2022 Information is for End User's use only and may not be sold, redistributed or otherwise used for commercial purposes  All illustrations and images included in CareNotes® are the copyrighted property of A D A M , Inc  or [employer]   The above information is an  only  It is not intended as medical advice for individual conditions or treatments  Talk to your doctor, nurse or pharmacist before following any medical regimen to see if it is safe and effective for you  Follow up with PCP in 3-5 days  Proceed to  ER if symptoms worsen  Chief Complaint   No chief complaint on file  History of Present Illness       68 y/o female presents today for evaluation of a rash on her flank that started 2 days ago  She states it has been achy and burning and then she noticed the rash which she thought was an insect bite initially  Reports feeling achy and tired 2 days ago but not since  Review of Systems   Review of Systems   Constitutional: Negative for chills, fatigue and fever  HENT: Negative for postnasal drip, sore throat and trouble swallowing  Respiratory: Negative for chest tightness and shortness of breath  Gastrointestinal: Negative for abdominal pain  Genitourinary: Negative for dysuria  Skin: Positive for rash  Allergic/Immunologic: Negative for immunocompromised state  Neurological: Negative for dizziness, light-headedness and headaches           Current Medications       Current Outpatient Medications:     aspirin 81 MG tablet, Take 81 mg by mouth daily  , Disp: , Rfl:     Biotin 1 MG CAPS, Take by mouth, Disp: , Rfl:   Cholecalciferol 50 MCG (2000 UT) CAPS, Take 1 capsule by mouth daily, Disp: , Rfl:     Cyanocobalamin ER 1000 MCG TBCR, Take 1 tablet by mouth daily, Disp: , Rfl:     Menaquinone-7 (VITAMIN K2 PO), Take by mouth, Disp: , Rfl:     Multiple Vitamin (MULTI-VITAMINS PO), Take 1 tablet by mouth daily, Disp: , Rfl:     Omega-3 Fatty Acids (FISH OIL MAXIMUM STRENGTH) 1200 MG CPDR, Take 2 capsules by mouth daily, Disp: , Rfl:     Red Yeast Rice Extract 600 MG TABS, Take 1 tablet by mouth daily, Disp: , Rfl:     valACYclovir (VALTREX) 1,000 mg tablet, Take 1 tablet (1,000 mg total) by mouth 3 (three) times a day for 7 days, Disp: 21 tablet, Rfl: 0    vitamin E 100 UNIT capsule, Take 100 Units by mouth daily  , Disp: , Rfl:     acetaminophen-codeine (TYLENOL with CODEINE #3) 300-30 MG per tablet, acetaminophen 300 mg-codeine 30 mg tablet  Take 1-2 tablets by oral route every 4-6 hours as needed for post op pain (Patient not taking: Reported on 7/27/2022), Disp: , Rfl:     Current Allergies     Allergies as of 07/27/2022 - Reviewed 07/27/2022   Allergen Reaction Noted    Other Hives 05/25/2018    Pollen extract Other (See Comments) 05/25/2018    Benzalkonium chloride Rash 09/14/2018    Neomycin-bacitracin zn-polymyx Rash 07/18/2014            The following portions of the patient's history were reviewed and updated as appropriate: allergies, current medications, past family history, past medical history, past social history, past surgical history and problem list      Past Medical History:   Diagnosis Date    Cancer ([facility] [address] )     left breast, lumpectomy,with chemo and radiation    Hyperlipidemia        Past Surgical History:   Procedure Laterality Date    BREAST SURGERY Left     lumpectomy    CATARACT EXTRACTION      last assessed 3/21/16    COLONOSCOPY      NEUROPLASTY / TRANSPOSITION MEDIAN NERVE AT CARPAL TUNNEL      last assessed 3/21/16     TONSILLECTOMY      TOOTH EXTRACTION      last assessed 3/21/16       Family History   Problem Relation Age of Onset    Diabetes Paternal Aunt          Medications have been verified  Objective   /71   Pulse 77   Temp (!) 97 °F (36 1 °C) (Tympanic)   Resp 16   SpO2 99%        Physical Exam     Physical Exam  Vitals and nursing note reviewed  Constitutional:       Appearance: Normal appearance  HENT:      Head: Normocephalic and atraumatic  Skin:     General: Skin is warm and dry  Capillary Refill: Capillary refill takes less than 2 seconds  Findings: Rash (3 vesicles on the left flank with surrounding erythema) present  Neurological:      General: No focal deficit present  Mental Status: She is alert and oriented to person, place, and time     Psychiatric:         Mood and Affect: Mood normal          Behavior: Behavior normal

## 2022-11-15 ENCOUNTER — TELEPHONE (OUTPATIENT)
Dept: INTERNAL MEDICINE CLINIC | Facility: CLINIC | Age: 72
End: 2022-11-15

## 2022-11-15 NOTE — TELEPHONE ENCOUNTER
Joe Salmon called asking about her labs that are scanned into [facility] from [place] done on 10/27

## 2022-12-11 ENCOUNTER — RA CDI HCC (OUTPATIENT)
Dept: OTHER | Facility: HOSPITAL | Age: 72
End: 2022-12-11

## 2022-12-11 NOTE — PROGRESS NOTES
[facility]  coding opportunities       Chart reviewed, no opportunity found:   [address]        Patients Insurance     Medicare Insurance: [employer]

## 2022-12-19 ENCOUNTER — OFFICE VISIT (OUTPATIENT)
Dept: INTERNAL MEDICINE CLINIC | Facility: CLINIC | Age: 72
End: 2022-12-19

## 2022-12-19 VITALS
TEMPERATURE: 97 F | DIASTOLIC BLOOD PRESSURE: 80 MMHG | SYSTOLIC BLOOD PRESSURE: 120 MMHG | WEIGHT: 165 LBS | HEIGHT: 62 IN | HEART RATE: 70 BPM | BODY MASS INDEX: 30.36 KG/M2 | OXYGEN SATURATION: 99 % | RESPIRATION RATE: 16 BRPM

## 2022-12-19 DIAGNOSIS — Z00.00 WELL ADULT EXAM: Primary | ICD-10-CM

## 2022-12-19 PROBLEM — E66.9 OBESITY: Status: RESOLVED | Noted: 2018-05-25 | Resolved: 2022-12-19

## 2022-12-19 PROBLEM — F11.20 CONTINUOUS OPIOID DEPENDENCE (HCC): Status: RESOLVED | Noted: 2021-12-17 | Resolved: 2022-12-19

## 2022-12-19 NOTE — PROGRESS NOTES
INTERNAL MEDICINE OFFICE VISIT       NAME: Rolando Florez  AGE: 67 y o  SEX: female       : 1950        MRN: [id number]    DATE: 2022  TIME: 8:31 AM    Assessment and Plan   1  Well adult exam         Follow-up in 6 months        Chief Complaint     Chief Complaint   Patient presents with   • Physical Exam       History of Present Illness   Rolando Florez is a 67y o -year-old female who is here for an annual preventive examination  Chyrl Duty is up-to-date on her preventive care  Colonoscopy was 2019, mammography as part of surveillance with history of left breast cancer  Eye care, dental care up-to-date  Lab work: Lipid panel is pending and will be done in the near future  DEXA scan is due and we help with scheduling today after ordering this earlier this year  We reviewed the  visit from oncology  Notably is now considered a long-term survivor of her breast cancer since diagnosis in 2018 with surgery and subsequent chemotherapy and radiation therapy  She is feeling well overall and has a great outlook  She has excellent social supports  There are no acute problems today  Review of Systems   Review of Systems currently without any significant neuropathic or other pain      Active Problem List     Patient Active Problem List   Diagnosis   • Dependent edema   • Venous insufficiency   • Lumbosacral spondylosis without myelopathy   • Chemotherapy-induced neuropathy (HCC)   • Malignant neoplasm of upper-outer quadrant of left breast in female, estrogen receptor negative ([facility] [address] )   • Pure hypercholesterolemia       The following portions of the patient's history were reviewed and updated as appropriate: allergies, current medications, past family history, past medical history, past social history, past surgical history, and problem list     Objective     Vitals:    22 0804   BP: 120/80   Pulse: 70   Resp: 16   Temp: (!) 97 °F (36 1 °C)   SpO2: 99%     Wt Readings from Last 3 Encounters:   12/19/22 74 8 kg (165 lb)   11/04/22 71 2 kg (157 lb)   02/10/22 70 7 kg (155 lb 12 8 oz)       Physical Exam   Vital signs stable, alert and oriented no distress  Normocephalic/atraumatic  Mood optimal   Cognitive status intact  Skin warm dry without pallor or icterus  Neck supple without JVD  No head or neck mass or adenopathy  No supraclavicular adenopathy  Trach midline without stridor thyroid exam is normal   Lungs are clear  Cardiac: Regular rate and rhythm, normal S1 and S2, no murmur, no S4 or S3 or rub  Abdomen: Nondistended with normal bowel sounds, soft nontender without masses bruits organomegaly  Extremities: Trace edema with intact circulation with mild venous deficiency without phlebitic findings  Also strength are excellent  Joint function well-preserved  Pertinent Laboratory/Diagnostic Studies:        No orders of the defined types were placed in this encounter        ALLERGIES:  Allergies   Allergen Reactions   • Other Hives     Apples, pears, peaches    • Pollen Extract Other (See Comments)   • Benzalkonium Chloride Rash   • Neomycin-Bacitracin Zn-Polymyx Rash       Current Medications     Current Outpatient Medications   Medication Sig Dispense Refill   • aspirin 81 MG tablet Take 81 mg by mouth daily       • Biotin 1 MG CAPS Take by mouth     • Cholecalciferol 50 MCG (2000 UT) CAPS Take 1 capsule by mouth daily     • Cyanocobalamin ER 1000 MCG TBCR Take 1 tablet by mouth daily     • Menaquinone-7 (VITAMIN K2 PO) Take by mouth     • Multiple Vitamin (MULTI-VITAMINS PO) Take 1 tablet by mouth daily     • Omega-3 Fatty Acids (FISH OIL MAXIMUM STRENGTH) 1200 MG CPDR Take 2 capsules by mouth daily     • Red Yeast Rice Extract 600 MG TABS Take 1 tablet by mouth daily     • vitamin E 100 UNIT capsule Take 100 Units by mouth daily       • valACYclovir (VALTREX) 1,000 mg tablet Take 1 tablet (1,000 mg total) by mouth 3 (three) times a day for 7 days 21 tablet 0     No current facility-administered medications for this visit           Mei Lamas MD

## 2022-12-23 ENCOUNTER — APPOINTMENT (OUTPATIENT)
Dept: LAB | Facility: MEDICAL CENTER | Age: 72
End: 2022-12-23

## 2022-12-23 LAB
CHOLEST SERPL-MCNC: 213 MG/DL
HBV CORE AB SER QL: NORMAL
HBV CORE IGM SER QL: NORMAL
HBV SURFACE AG SER QL: NORMAL
HCV AB SER QL: NORMAL
HDLC SERPL-MCNC: 67 MG/DL
LDLC SERPL CALC-MCNC: 130 MG/DL (ref 0–100)
NONHDLC SERPL-MCNC: 146 MG/DL
TRIGL SERPL-MCNC: 82 MG/DL

## 2022-12-30 ENCOUNTER — TELEPHONE (OUTPATIENT)
Dept: INTERNAL MEDICINE CLINIC | Facility: CLINIC | Age: 72
End: 2022-12-30

## 2023-01-03 ENCOUNTER — TELEPHONE (OUTPATIENT)
Dept: INTERNAL MEDICINE CLINIC | Facility: CLINIC | Age: 73
End: 2023-01-03

## 2023-01-03 NOTE — TELEPHONE ENCOUNTER
Mae called and said she missed your call on Friday    Do you need to speak with her?    [phone]

## 2023-01-08 ENCOUNTER — NURSE TRIAGE (OUTPATIENT)
Dept: OTHER | Facility: OTHER | Age: 73
End: 2023-01-08

## 2023-01-08 NOTE — TELEPHONE ENCOUNTER
Regarding: ear, throat ache, cough  ----- Message from Kp Campuzano sent at 1/8/2023  9:53 AM EST -----  " My ears and throat are achy  I have a cough as well   I do not feel well "

## 2023-01-08 NOTE — TELEPHONE ENCOUNTER
Patient with earache and sore throat  Patient denies fever  Patient given advise on how to manage symptoms at home  Patient verbalized understanding and encouraged to call office back later this week if symptoms worsen  Reason for Disposition  • Earache    Answer Assessment - Initial Assessment Questions  1  ONSET: "When did the symptoms start?"       1/6    2  AMOUNT: "How much discharge is there?"       N/A    3  COUGH: "Do you have a cough?" If yes, ask: "Describe the color of your sputum" (clear, white, yellow, green)      Denies    4  RESPIRATORY DISTRESS: "Describe your breathing "       Denies    5  FEVER: "Do you have a fever?" If Yes, ask: "What is your temperature, how was it measured, and when did it start?"      Denies    6  SEVERITY: "Overall, how bad are you feeling right now?" (e g , doesn't interfere with normal activities, staying home from school/work, staying in bed)       I feel like I just have a head cold    7  OTHER SYMPTOMS: "Do you have any other symptoms?" (e g , sore throat, earache, wheezing, vomiting)      Earache, and sore throat    8   PREGNANCY: "Is there any chance you are pregnant?" "When was your last menstrual period?"      N/A    Protocols used: COMMON COLD-ADULT-AH

## 2023-01-09 ENCOUNTER — TELEPHONE (OUTPATIENT)
Dept: INTERNAL MEDICINE CLINIC | Facility: CLINIC | Age: 73
End: 2023-01-09

## 2023-01-09 DIAGNOSIS — R05.9 COUGH, UNSPECIFIED TYPE: Primary | ICD-10-CM

## 2023-01-09 RX ORDER — BENZONATATE 200 MG/1
200 CAPSULE ORAL 3 TIMES DAILY PRN
Qty: 20 CAPSULE | Refills: 1 | Status: SHIPPED | OUTPATIENT
Start: 2023-01-09

## 2023-01-09 NOTE — TELEPHONE ENCOUNTER
Pt called stating she started experiencing cold symptoms on Friday after being around sick declan  Pt states current symptoms of runny nose and dry cough  Pt states is treating runny nose, but cough is getting worse  Pt requesting medication just for cough   Confirmed [facility] pharmacy

## 2023-02-16 ENCOUNTER — HOSPITAL ENCOUNTER (OUTPATIENT)
Dept: BONE DENSITY | Facility: MEDICAL CENTER | Age: 73
Discharge: HOME | End: 2023-02-16

## 2023-02-16 DIAGNOSIS — C50.412 MALIGNANT NEOPLASM OF UPPER-OUTER QUADRANT OF LEFT BREAST IN FEMALE, ESTROGEN RECEPTOR NEGATIVE (HCC): ICD-10-CM

## 2023-02-16 DIAGNOSIS — Z17.1 MALIGNANT NEOPLASM OF UPPER-OUTER QUADRANT OF LEFT BREAST IN FEMALE, ESTROGEN RECEPTOR NEGATIVE (HCC): ICD-10-CM

## 2023-02-24 NOTE — RESULT ENCOUNTER NOTE
Hi Mae  Your DEXA scan shows normal bone density for your age  There is no osteoporosis  Your fracture risk is low     Dr Cherie Lepe

## 2023-06-19 ENCOUNTER — OFFICE VISIT (OUTPATIENT)
Dept: URGENT CARE | Facility: MEDICAL CENTER | Age: 73
End: 2023-06-19
Payer: COMMERCIAL

## 2023-06-19 VITALS
HEART RATE: 80 BPM | OXYGEN SATURATION: 98 % | TEMPERATURE: 98.5 F | RESPIRATION RATE: 20 BRPM | SYSTOLIC BLOOD PRESSURE: 120 MMHG | DIASTOLIC BLOOD PRESSURE: 80 MMHG

## 2023-06-19 DIAGNOSIS — B02.9 HERPES ZOSTER WITHOUT COMPLICATION: Primary | ICD-10-CM

## 2023-06-19 PROCEDURE — 99213 OFFICE O/P EST LOW 20 MIN: CPT | Performed by: FAMILY MEDICINE

## 2023-06-19 RX ORDER — VALACYCLOVIR HYDROCHLORIDE 1 G/1
1000 TABLET, FILM COATED ORAL 3 TIMES DAILY
Qty: 21 TABLET | Refills: 0 | Status: SHIPPED | OUTPATIENT
Start: 2023-06-19 | End: 2023-06-26

## 2023-06-20 NOTE — PROGRESS NOTES
[address] [employer] [facility]        NAME: Urszula Mills is a 67 y o  female  : 1950    MRN: [id number]  DATE: 2023  TIME: 8:07 PM    Assessment and Plan   Herpes zoster without complication [D30 3]  1  Herpes zoster without complication  valACYclovir (VALTREX) 1,000 mg tablet            Patient Instructions       Follow up with PCP in 3-5 days  Proceed to  ER if symptoms worsen  Chief Complaint     Chief Complaint   Patient presents with   • Rash     Pt C/O rash on left flank, believes it may be shingles  The rash appeared last night  History of Present Illness       72-year-old female here today with complaint of rash on her left flank that appeared last night  She informs me that 2 days ago while on a trip to the [place], she started feeling somewhat ill  Rash in the left flank area feels tender to touch  She applied some tea tree oil which diminish the pain  Denies any fever  She had a similar episode approximate 1 year ago  Review of Systems   Review of Systems   Skin: Positive for rash           Current Medications       Current Outpatient Medications:   •  valACYclovir (VALTREX) 1,000 mg tablet, Take 1 tablet (1,000 mg total) by mouth 3 (three) times a day for 7 days, Disp: 21 tablet, Rfl: 0  •  aspirin 81 MG tablet, Take 81 mg by mouth daily  , Disp: , Rfl:   •  benzonatate (TESSALON) 200 MG capsule, Take 1 capsule (200 mg total) by mouth 3 (three) times a day as needed for cough, Disp: 20 capsule, Rfl: 1  •  Biotin 1 MG CAPS, Take by mouth, Disp: , Rfl:   •  Cholecalciferol 50 MCG (2000 UT) CAPS, Take 1 capsule by mouth daily, Disp: , Rfl:   •  Cyanocobalamin ER 1000 MCG TBCR, Take 1 tablet by mouth daily, Disp: , Rfl:   •  Menaquinone-7 (VITAMIN K2 PO), Take by mouth, Disp: , Rfl:   •  Multiple Vitamin (MULTI-VITAMINS PO), Take 1 tablet by mouth daily, Disp: , Rfl:   •  Omega-3 Fatty Acids (FISH OIL MAXIMUM STRENGTH) 1200 MG CPDR, Take 2 capsules by mouth daily, Disp: , Rfl: •  Red Yeast Rice Extract 600 MG TABS, Take 1 tablet by mouth daily, Disp: , Rfl:   •  valACYclovir (VALTREX) 1,000 mg tablet, Take 1 tablet (1,000 mg total) by mouth 3 (three) times a day for 7 days, Disp: 21 tablet, Rfl: 0  •  vitamin E 100 UNIT capsule, Take 100 Units by mouth daily  , Disp: , Rfl:     Current Allergies     Allergies as of 06/19/2023 - Reviewed 06/19/2023   Allergen Reaction Noted   • Other Hives 05/25/2018   • Pollen extract Other (See Comments) 05/25/2018   • Benzalkonium chloride Rash 09/14/2018   • Neomycin-bacitracin zn-polymyx Rash 07/18/2014            The following portions of the patient's history were reviewed and updated as appropriate: allergies, current medications, past family history, past medical history, past social history, past surgical history and problem list      Past Medical History:   Diagnosis Date   • Cancer ([address] )     left breast, lumpectomy,with chemo and radiation   • Hyperlipidemia        Past Surgical History:   Procedure Laterality Date   • BREAST SURGERY Left     lumpectomy   • CATARACT EXTRACTION      last assessed 3/21/16   • COLONOSCOPY     • NEUROPLASTY / TRANSPOSITION MEDIAN NERVE AT CARPAL TUNNEL      last assessed 3/21/16    • TONSILLECTOMY     • TOOTH EXTRACTION      last assessed 3/21/16       Family History   Problem Relation Age of Onset   • Diabetes Paternal Aunt          Medications have been verified  Objective   /80 (BP Location: Right arm, Patient Position: Sitting, Cuff Size: Standard)   Pulse 80   Temp 98 5 °F (36 9 °C) (Tympanic)   Resp 20   SpO2 98%   No LMP recorded  Patient is postmenopausal        Physical Exam     Physical Exam  Skin:     Findings: Rash present  Comments: Left flank area reveals multiple erythematous vesicular lesion cluster on the left flank area radiating to the left axillary area  Findings are consistent with shingles

## 2023-06-20 NOTE — PATIENT INSTRUCTIONS
I prescribed valacyclovir 1000 mg 3 times a day for 7 days  Advised patient to keep rash covered until completely dried  Also discussed with her recommendation of undergoing Shingrix vaccine to prevent recurrent shingles  She expressed understanding  Shingles   WHAT YOU NEED TO KNOW:   Shingles is a viral infection that causes a painful rash  Shingles is caused by the varicella-zoster virus  This is the same virus that causes chickenpox  The virus stays in your body after you have chickenpox, without causing any symptoms  Shingles occurs when the virus becomes active again  The active virus travels along a nerve to your skin and causes a rash  The rash usually lasts 2 to 3 weeks  Most people have shingles one time, but it is possible to develop it again  DISCHARGE INSTRUCTIONS:   Call your local emergency number (911 in the [address] [place] [address],[address]) if:   You have trouble moving your arms, legs, or face  You become confused, or have trouble speaking  You have a seizure  Return to the emergency department if:   You have weakness in an arm or leg  You have dizziness, a severe headache, or hearing or vision loss  You have painful, red, warm skin around the blisters, or the blisters drain pus  Your neck is stiff or you have trouble moving it  Call your doctor if:   A painful rash appears near your eye  The rash spreads to more areas and your pain worsens  You feel weak or have a headache  You have a cough, chills, or a fever  You have abdominal pain or nausea, or you are vomiting  You have questions or concerns about your condition or care  Medicines: You may need any of the following:  Antiviral medicine  fights the virus causing your shingles  Start this medicine within 3 days after you notice the first symptoms  This may help prevent nerve pain  A shingles outbreak can cause nerve pain called post-herpetic neuralgia (PHN)  PHN can last a long time after you heal from shingles      Topical anesthetics  are used to numb the skin and decrease pain  They can be a cream, gel, spray, or patch  Anticonvulsants and antidepressants  decrease nerve pain and may help you sleep at night  Antihistamines  may help decrease itching  Acetaminophen  decreases pain and fever  It is available without a doctor's order  Ask how much to take and how often to take it  Follow directions  Read the labels of all other medicines you are using to see if they also contain acetaminophen, or ask your doctor or pharmacist  Acetaminophen can cause liver damage if not taken correctly  NSAIDs , such as ibuprofen, help decrease swelling, pain, and fever  This medicine is available with or without a doctor's order  NSAIDs can cause stomach bleeding or kidney problems in certain people  If you take blood thinner medicine, always ask your healthcare provider if NSAIDs are safe for you  Always read the medicine label and follow directions  A steroid and numbing medicine injection  may decrease severe pain that does not get better with other medicines  Take your medicine as directed  Contact your healthcare provider if you think your medicine is not helping or if you have side effects  Tell your provider if you are allergic to any medicine  Keep a list of the medicines, vitamins, and herbs you take  Include the amounts, and when and why you take them  Bring the list or the pill bottles to follow-up visits  Carry your medicine list with you in case of an emergency  Self-care:   Apply a cool, wet compress  or take a cool bath  This may help decrease itching and pain  Keep your rash clean and dry  Cover your rash with a bandage  Do not use bandages with adhesive  Clothes may irritate your skin  Prevent the spread of the shingles virus:  The virus can be passed to a person who has never had chickenpox  This usually happens if the other person comes in contact with your open sores   This person may get chickenpox, but not shingles  You are contagious until your blisters scab over  Stay away from people who have not had chickenpox or the chickenpox vaccine  Avoid pregnant women, newborns, and people with weak immune systems  They have a higher risk of infection  Wash your hands often  Wash your hands several times each day  Wash after you use the bathroom, change a child's diaper, and before you prepare or eat food  Use soap and water every time  Rub your soapy hands together, lacing your fingers  Wash the front and back of your hands, and in between your fingers  Use the fingers of one hand to scrub under the fingernails of the other hand  Wash for at least 20 seconds  Rinse with warm, running water for several seconds  Then dry your hands with a clean towel or paper towel  Use hand  that contains alcohol if soap and water are not available  Do not touch your eyes, nose, or mouth without washing your hands first          Cover a sneeze or cough  Use a tissue that covers your mouth and nose  Throw the tissue away in a trash can right away  Use the bend of your arm if a tissue is not available  Wash your hands well with soap and water or use a hand   Prevent shingles or another shingles outbreak:   A vaccine may be given to help prevent shingles  You can get the vaccine even if you already had shingles  The vaccine comes in 2 forms  A 2-dose vaccine is usually given to adults 48 years or older  A 1-dose vaccine may be given to adults 61 years or older  The vaccine can help prevent a future outbreak  If you do get shingles again, the vaccine can keep it from becoming severe  Ask your healthcare provider about other vaccines you may need  Follow up with your doctor as directed:  Write down your questions so you remember to ask them during your visits    For more information:   [facility]  [address] , [address]  Phone: 7- 428 - 1489799  Phone: 7- 128 - 6057045  Web Address: [employer] com br    © Copyright Reggie Lawson 2022 Information is for End User's use only and may not be sold, redistributed or otherwise used for commercial purposes  The above information is an  only  It is not intended as medical advice for individual conditions or treatments  Talk to your doctor, nurse or pharmacist before following any medical regimen to see if it is safe and effective for you

## 2023-06-26 ENCOUNTER — TELEPHONE (OUTPATIENT)
Dept: INTERNAL MEDICINE CLINIC | Facility: CLINIC | Age: 73
End: 2023-06-26

## 2023-07-06 ENCOUNTER — TELEPHONE (OUTPATIENT)
Dept: INTERNAL MEDICINE CLINIC | Facility: CLINIC | Age: 73
End: 2023-07-06

## 2023-07-06 DIAGNOSIS — N30.00 ACUTE CYSTITIS WITHOUT HEMATURIA: Primary | ICD-10-CM

## 2023-07-06 RX ORDER — NITROFURANTOIN 25; 75 MG/1; MG/1
100 CAPSULE ORAL 2 TIMES DAILY
Qty: 10 CAPSULE | Refills: 0 | Status: SHIPPED | OUTPATIENT
Start: 2023-07-06 | End: 2023-07-11

## 2023-07-06 NOTE — TELEPHONE ENCOUNTER
I just got over the shingles and now I have. I don't know if I have a UTI when I pee. It's burning me. So I just wanted to know if I should take something or what I should do is [phone]. Thank you. Bye.   do you want to order a UA or just order meds?

## 2023-07-27 ENCOUNTER — TELEPHONE (OUTPATIENT)
Dept: INTERNAL MEDICINE CLINIC | Facility: CLINIC | Age: 73
End: 2023-07-27

## 2023-07-27 NOTE — TELEPHONE ENCOUNTER
"Hi, this is Mae Isaias Nishant. My YOB: 1950. Doctor Brain Talbot had ordered me some meds thinking I had a UTI, but I'm still burning when I pee, so maybe I need to have some blood work done, make sure my glucose is OK. So if he wants to do some blood work, you could just send it over to [employer] on the website. And I'll probably go Sunday because we're out of town. So I would appreciate it if you could order some blood work for me. And it's Sumi Renner.  July 19th, 1950 Thank you My phone number, [phone]."

## 2023-07-31 ENCOUNTER — TELEPHONE (OUTPATIENT)
Dept: INTERNAL MEDICINE CLINIC | Facility: CLINIC | Age: 73
End: 2023-07-31

## 2023-07-31 DIAGNOSIS — Z13.0 SCREENING FOR DEFICIENCY ANEMIA: ICD-10-CM

## 2023-07-31 DIAGNOSIS — E78.00 PURE HYPERCHOLESTEROLEMIA: Primary | ICD-10-CM

## 2023-07-31 DIAGNOSIS — Z13.1 SCREENING FOR DIABETES MELLITUS: ICD-10-CM

## 2023-07-31 NOTE — TELEPHONE ENCOUNTER
Patient called and is coming in   Friday and asked for blood work and also asked for her glucose to be checked and she does not go [facility]. I can call her back at [phone].   Thank you

## 2023-08-02 LAB
25(OH)D3+25(OH)D2 SERPL-MCNC: 32.6 NG/ML (ref 30–100)
ALBUMIN SERPL-MCNC: 4.3 G/DL (ref 3.8–4.8)
ALBUMIN/GLOB SERPL: 2 {RATIO} (ref 1.2–2.2)
ALP SERPL-CCNC: 63 IU/L (ref 44–121)
ALT SERPL-CCNC: 19 IU/L (ref 0–32)
AST SERPL-CCNC: 24 IU/L (ref 0–40)
BASOPHILS # BLD AUTO: 0 X10E3/UL (ref 0–0.2)
BASOPHILS NFR BLD AUTO: 1 %
BILIRUB SERPL-MCNC: 0.3 MG/DL (ref 0–1.2)
BUN SERPL-MCNC: 16 MG/DL (ref 8–27)
BUN/CREAT SERPL: 19 (ref 12–28)
CALCIUM SERPL-MCNC: 9.6 MG/DL (ref 8.7–10.3)
CHLORIDE SERPL-SCNC: 104 MMOL/L (ref 96–106)
CHOLEST SERPL-MCNC: 211 MG/DL (ref 100–199)
CO2 SERPL-SCNC: 23 MMOL/L (ref 20–29)
CREAT SERPL-MCNC: 0.86 MG/DL (ref 0.57–1)
EGFR: 71 ML/MIN/1.73
EOSINOPHIL # BLD AUTO: 0.1 X10E3/UL (ref 0–0.4)
EOSINOPHIL NFR BLD AUTO: 3 %
ERYTHROCYTE [DISTWIDTH] IN BLOOD BY AUTOMATED COUNT: 13.4 % (ref 11.7–15.4)
GLOBULIN SER-MCNC: 2.1 G/DL (ref 1.5–4.5)
GLUCOSE SERPL-MCNC: 108 MG/DL (ref 70–99)
HCT VFR BLD AUTO: 39.8 % (ref 34–46.6)
HDLC SERPL-MCNC: 62 MG/DL
HGB BLD-MCNC: 13.4 G/DL (ref 11.1–15.9)
IMM GRANULOCYTES # BLD: 0 X10E3/UL (ref 0–0.1)
IMM GRANULOCYTES NFR BLD: 0 %
LDLC SERPL CALC-MCNC: 135 MG/DL (ref 0–99)
LYMPHOCYTES # BLD AUTO: 1.2 X10E3/UL (ref 0.7–3.1)
LYMPHOCYTES NFR BLD AUTO: 29 %
MCH RBC QN AUTO: 29.9 PG (ref 26.6–33)
MCHC RBC AUTO-ENTMCNC: 33.7 G/DL (ref 31.5–35.7)
MCV RBC AUTO: 89 FL (ref 79–97)
MONOCYTES # BLD AUTO: 0.5 X10E3/UL (ref 0.1–0.9)
MONOCYTES NFR BLD AUTO: 13 %
NEUTROPHILS # BLD AUTO: 2.2 X10E3/UL (ref 1.4–7)
NEUTROPHILS NFR BLD AUTO: 54 %
PLATELET # BLD AUTO: 251 X10E3/UL (ref 150–450)
POTASSIUM SERPL-SCNC: 4.4 MMOL/L (ref 3.5–5.2)
PROT SERPL-MCNC: 6.4 G/DL (ref 6–8.5)
RBC # BLD AUTO: 4.48 X10E6/UL (ref 3.77–5.28)
SL AMB VLDL CHOLESTEROL CALC: 14 MG/DL (ref 5–40)
SODIUM SERPL-SCNC: 141 MMOL/L (ref 134–144)
TRIGL SERPL-MCNC: 80 MG/DL (ref 0–149)
WBC # BLD AUTO: 4 X10E3/UL (ref 3.4–10.8)

## 2023-08-04 ENCOUNTER — OFFICE VISIT (OUTPATIENT)
Dept: INTERNAL MEDICINE CLINIC | Facility: CLINIC | Age: 73
End: 2023-08-04
Payer: COMMERCIAL

## 2023-08-04 VITALS
SYSTOLIC BLOOD PRESSURE: 126 MMHG | WEIGHT: 162.6 LBS | OXYGEN SATURATION: 96 % | BODY MASS INDEX: 29.92 KG/M2 | HEIGHT: 62 IN | HEART RATE: 79 BPM | DIASTOLIC BLOOD PRESSURE: 84 MMHG | TEMPERATURE: 98 F

## 2023-08-04 DIAGNOSIS — Z17.1 MALIGNANT NEOPLASM OF UPPER-OUTER QUADRANT OF LEFT BREAST IN FEMALE, ESTROGEN RECEPTOR NEGATIVE (HCC): ICD-10-CM

## 2023-08-04 DIAGNOSIS — T45.1X5A CHEMOTHERAPY-INDUCED NEUROPATHY (HCC): ICD-10-CM

## 2023-08-04 DIAGNOSIS — E78.00 PURE HYPERCHOLESTEROLEMIA: ICD-10-CM

## 2023-08-04 DIAGNOSIS — R73.09 ELEVATED GLUCOSE: ICD-10-CM

## 2023-08-04 DIAGNOSIS — F43.9 SITUATIONAL STRESS: Primary | ICD-10-CM

## 2023-08-04 DIAGNOSIS — G62.0 CHEMOTHERAPY-INDUCED NEUROPATHY (HCC): ICD-10-CM

## 2023-08-04 DIAGNOSIS — C50.412 MALIGNANT NEOPLASM OF UPPER-OUTER QUADRANT OF LEFT BREAST IN FEMALE, ESTROGEN RECEPTOR NEGATIVE (HCC): ICD-10-CM

## 2023-08-04 PROBLEM — G56.00 CARPAL TUNNEL SYNDROME: Status: ACTIVE | Noted: 2023-08-04

## 2023-08-04 PROBLEM — I87.2 EDEMA OF BOTH LOWER EXTREMITIES DUE TO PERIPHERAL VENOUS INSUFFICIENCY: Status: ACTIVE | Noted: 2023-08-04

## 2023-08-04 PROCEDURE — 99214 OFFICE O/P EST MOD 30 MIN: CPT | Performed by: INTERNAL MEDICINE

## 2023-08-04 NOTE — PROGRESS NOTES
INTERNAL MEDICINE OFFICE VISIT       NAME: Prabhakar Lopez  AGE: 68 y.o. SEX: female       : 1950        MRN: [id number]    DATE: 2023  TIME: 11:29 AM    Assessment and Plan   1. Situational stress    2. Pure hypercholesterolemia  -     Lipid panel    3. Elevated glucose  -     Hemoglobin A1C  -     Basic metabolic panel         Follow-up will be in late December for annual preventive examination. Lab work ordered today is to be done just prior to this visit. Chief Complaint     Chief Complaint   Patient presents with   • Follow-up       History of Present Illness   Prabhakar Lopez is a 68y.o.-year-old female who returns for scheduled follow-up visit. Sheridan Loya takes very good care of her health. She eats well and walks daily. Sheridan Loya has intolerance to statins and is taking a plant-based statin supplement along with following a healthy lifestyle. We reviewed her lab work of  showing total cholesterol 211, , HDL 62 and triglycerides of 80. Plan is to continue these interventions. Vitamin D deficiency is well compensated with current vitamin D level of 32.6 on supplementation. Also reviewed with CMP was normal blood sugar 108, normal CBC. Sheridan Loya is under some stress as her  is having a hard time coping with chronic illness. He had a stroke years ago and is still having problems and has been somewhat emotionally volatile. He also has a recent diagnosis of prostate cancer and this is also causing him emotional distress and at times he overreacts to minor things. This is causing Sheridan Loya to have some stress related dyspepsia for which Pepcid was recommended. Symptoms are not indicative of peptic ulcer disease, nor does her seem to be any cardiac component. I will discuss with her  who I be seeing later this month regarding help him with his stress.     Laura Tessy is a long-term survivor of breast cancer and last saw her breast cancer treatment team at [facility] on April 10. Annual mammogram is up-to-date and due in October. There is no advancement of chemotherapy related neuropathy symptoms at this time. Review of Systems   Review of Systems   Respiratory: Negative. Cardiovascular: Positive for leg swelling. Negative for chest pain. Psychiatric/Behavioral: Negative for dysphoric mood and suicidal ideas. The patient is nervous/anxious. Active Problem List     Patient Active Problem List   Diagnosis   • Lumbosacral spondylosis without myelopathy   • Chemotherapy-induced neuropathy (HCC)   • Malignant neoplasm of upper-outer quadrant of left breast in female, estrogen receptor negative ([address])   • Pure hypercholesterolemia   • Carpal tunnel syndrome   • Edema of both lower extremities due to peripheral venous insufficiency   • Situational stress       The following portions of the patient's history were reviewed and updated as appropriate: allergies, current medications, past family history, past medical history, past social history, past surgical history, and problem list.    Objective     Vitals:    08/04/23 1044   BP: 126/84   Pulse: 79   Temp: 98 °F (36.7 °C)   SpO2: 96%     Wt Readings from Last 3 Encounters:   08/04/23 73.8 kg (162 lb 9.6 oz)   12/19/22 74.8 kg (165 lb)   11/04/22 71.2 kg (157 lb)       Physical Exam     Vital signs stable, very pleasant, alert and oriented no distress. Normocephalic atraumatic. No JVD. Cardiac exam is normal and auscultation. Lungs are clear. Abdomen: Nondistended with normal bowel sounds, nontender and soft without masses bruits organomegaly or hernia. Peripheral circulation is intact. There is mild pitting edema of lower legs with mild to moderate varicosities without phlebitic findings or calf tenderness.   Skin integrity is normal.  Gait is stable and normal.        Orders Placed This Encounter   Procedures   • Lipid panel   • Hemoglobin A1C   • Basic metabolic panel       ALLERGIES:  Allergies Allergen Reactions   • Benzalkonium Chloride Rash   • Neomycin-Bacitracin Zn-Polymyx Rash   • Other Hives     Apples, pears, peaches    • Pollen Extract Allergic Rhinitis       Current Medications     Current Outpatient Medications   Medication Sig Dispense Refill   • aspirin 81 MG tablet Take 81 mg by mouth daily       • Biotin 1 MG CAPS Take by mouth     • Cholecalciferol 50 MCG (2000 UT) CAPS Take 1 capsule by mouth daily     • Cyanocobalamin ER 1000 MCG TBCR Take 1 tablet by mouth daily     • Menaquinone-7 (VITAMIN K2 PO) Take by mouth     • Multiple Vitamin (MULTI-VITAMINS PO) Take 1 tablet by mouth daily     • Omega-3 Fatty Acids (FISH OIL MAXIMUM STRENGTH) 1200 MG CPDR Take 2 capsules by mouth daily     • Red Yeast Rice Extract 600 MG TABS Take 1 tablet by mouth daily     • vitamin E 100 UNIT capsule Take 100 Units by mouth daily       • valACYclovir (VALTREX) 1,000 mg tablet Take 1 tablet (1,000 mg total) by mouth 3 (three) times a day for 7 days 21 tablet 0     No current facility-administered medications for this visit.          Gerhard Blanchard MD

## 2023-09-27 ENCOUNTER — TELEPHONE (OUTPATIENT)
Dept: INTERNAL MEDICINE CLINIC | Facility: CLINIC | Age: 73
End: 2023-09-27

## 2023-09-27 DIAGNOSIS — K21.9 GASTROESOPHAGEAL REFLUX DISEASE, UNSPECIFIED WHETHER ESOPHAGITIS PRESENT: Primary | ICD-10-CM

## 2023-09-27 RX ORDER — OMEPRAZOLE 20 MG/1
20 CAPSULE, DELAYED RELEASE ORAL DAILY
Qty: 30 CAPSULE | Refills: 0 | Status: SHIPPED | OUTPATIENT
Start: 2023-09-27

## 2023-09-27 NOTE — TELEPHONE ENCOUNTER
Pt called stating she is taking Pepcid for her stomach pain. Pt stated the pain was still coming and going, but now she is getting a acidity taste in her mouth. Pt states no NVD, or change in bowels. States there is not pattern of when it affects her, she is just aware that the pepcid does not last long, and when it does wear off, either the pain immediately comes back or an she get the acidity taste. Pt is inquiring about an alternative, possibly stronger, medication, that can help.  Confirmed [facility] pharmacy

## 2023-10-21 DIAGNOSIS — K21.9 GASTROESOPHAGEAL REFLUX DISEASE, UNSPECIFIED WHETHER ESOPHAGITIS PRESENT: ICD-10-CM

## 2023-10-22 RX ORDER — OMEPRAZOLE 20 MG/1
CAPSULE, DELAYED RELEASE ORAL
Qty: 90 CAPSULE | Refills: 1 | Status: SHIPPED | OUTPATIENT
Start: 2023-10-22

## 2023-12-06 ENCOUNTER — NEW PATIENT COMPREHENSIVE (OUTPATIENT)
Dept: URBAN - METROPOLITAN AREA CLINIC 6 | Facility: CLINIC | Age: 73
End: 2023-12-06

## 2023-12-06 DIAGNOSIS — Z96.1: ICD-10-CM

## 2023-12-06 PROCEDURE — 92004 COMPRE OPH EXAM NEW PT 1/>: CPT

## 2023-12-06 ASSESSMENT — VISUAL ACUITY
OS_SC: J1
OD_SC: 20/30
OS_SC: 20/30
OD_SC: J10

## 2023-12-06 ASSESSMENT — TONOMETRY
OS_IOP_MMHG: 11
OD_IOP_MMHG: 17

## 2023-12-14 ENCOUNTER — RA CDI HCC (OUTPATIENT)
Dept: OTHER | Facility: HOSPITAL | Age: 73
End: 2023-12-14

## 2023-12-14 NOTE — PROGRESS NOTES
[address] [facility] coding opportunities       Chart reviewed, no opportunity found: [address]        Patients Insurance     Medicare Insurance: [employer]

## 2023-12-20 ENCOUNTER — OFFICE VISIT (OUTPATIENT)
Dept: INTERNAL MEDICINE CLINIC | Facility: CLINIC | Age: 73
End: 2023-12-20
Payer: COMMERCIAL

## 2023-12-20 VITALS
HEART RATE: 89 BPM | OXYGEN SATURATION: 100 % | DIASTOLIC BLOOD PRESSURE: 78 MMHG | TEMPERATURE: 97.4 F | SYSTOLIC BLOOD PRESSURE: 118 MMHG | BODY MASS INDEX: 29.81 KG/M2 | WEIGHT: 163 LBS

## 2023-12-20 DIAGNOSIS — T45.1X5A CHEMOTHERAPY-INDUCED NEUROPATHY: ICD-10-CM

## 2023-12-20 DIAGNOSIS — E78.00 PURE HYPERCHOLESTEROLEMIA: ICD-10-CM

## 2023-12-20 DIAGNOSIS — C50.412 MALIGNANT NEOPLASM OF UPPER-OUTER QUADRANT OF LEFT BREAST IN FEMALE, ESTROGEN RECEPTOR NEGATIVE: ICD-10-CM

## 2023-12-20 DIAGNOSIS — I87.2 EDEMA OF BOTH LOWER EXTREMITIES DUE TO PERIPHERAL VENOUS INSUFFICIENCY: ICD-10-CM

## 2023-12-20 DIAGNOSIS — G62.0 CHEMOTHERAPY-INDUCED NEUROPATHY: ICD-10-CM

## 2023-12-20 DIAGNOSIS — G56.00 CARPAL TUNNEL SYNDROME, UNSPECIFIED LATERALITY: ICD-10-CM

## 2023-12-20 DIAGNOSIS — Z17.1 MALIGNANT NEOPLASM OF UPPER-OUTER QUADRANT OF LEFT BREAST IN FEMALE, ESTROGEN RECEPTOR NEGATIVE: ICD-10-CM

## 2023-12-20 DIAGNOSIS — F43.9 SITUATIONAL STRESS: ICD-10-CM

## 2023-12-20 DIAGNOSIS — R73.09 ELEVATED GLUCOSE: ICD-10-CM

## 2023-12-20 DIAGNOSIS — M70.72 BURSITIS OF LEFT HIP, UNSPECIFIED BURSA: ICD-10-CM

## 2023-12-20 DIAGNOSIS — Z23 ENCOUNTER FOR IMMUNIZATION: ICD-10-CM

## 2023-12-20 DIAGNOSIS — Z00.00 MEDICARE ANNUAL WELLNESS VISIT, SUBSEQUENT: Primary | ICD-10-CM

## 2023-12-20 PROCEDURE — G0439 PPPS, SUBSEQ VISIT: HCPCS | Performed by: INTERNAL MEDICINE

## 2023-12-20 PROCEDURE — 99214 OFFICE O/P EST MOD 30 MIN: CPT | Performed by: INTERNAL MEDICINE

## 2023-12-20 PROCEDURE — 90662 IIV NO PRSV INCREASED AG IM: CPT | Performed by: INTERNAL MEDICINE

## 2023-12-20 PROCEDURE — G0008 ADMIN INFLUENZA VIRUS VAC: HCPCS | Performed by: INTERNAL MEDICINE

## 2023-12-20 NOTE — ASSESSMENT & PLAN NOTE
PMHx includes breast cancer s/p chemotherapy and radiation, HLD.  Doing well overall.  Follow-up labs ordered in August 2023, pending collection.  Patient to get blood work prior to next visit.

## 2023-12-20 NOTE — PATIENT INSTRUCTIONS
Medicare Preventive Visit Patient Instructions  Thank you for completing your Welcome to Medicare Visit or Medicare Annual Wellness Visit today. Your next wellness visit will be due in one year (12/20/2024).  The screening/preventive services that you may require over the next 5-10 years are detailed below. Some tests may not apply to you based off risk factors and/or age. Screening tests ordered at today's visit but not completed yet may show as past due. Also, please note that scanned in results may not display below.  Preventive Screenings:  Service Recommendations Previous Testing/Comments   Colorectal Cancer Screening  * Colonoscopy    * Fecal Occult Blood Test (FOBT)/Fecal Immunochemical Test (FIT)  * Fecal DNA/Cologuard Test  * Flexible Sigmoidoscopy Age: 45-75 years old   Colonoscopy: every 10 years (may be performed more frequently if at higher risk)  OR  FOBT/FIT: every 1 year  OR  Cologuard: every 3 years  OR  Sigmoidoscopy: every 5 years  Screening may be recommended earlier than age 45 if at higher risk for colorectal cancer. Also, an individualized decision between you and your healthcare provider will decide whether screening between the ages of 76-85 would be appropriate. Colonoscopy: 12/20/2019  FOBT/FIT: Not on file  Cologuard: Not on file  Sigmoidoscopy: Not on file          Breast Cancer Screening Age: 40+ years old  Frequency: every 1-2 years  Not required if history of left and right mastectomy Mammogram: 10/31/2023        Cervical Cancer Screening Between the ages of 21-29, pap smear recommended once every 3 years.   Between the ages of 30-65, can perform pap smear with HPV co-testing every 5 years.   Recommendations may differ for women with a history of total hysterectomy, cervical cancer, or abnormal pap smears in past. Pap Smear: Not on file        Hepatitis C Screening Once for adults born between 1945 and 1965  More frequently in patients at high risk for Hepatitis C Hep C Antibody:  08/18/2017        Diabetes Screening 1-2 times per year if you're at risk for diabetes or have pre-diabetes Fasting glucose: 98 mg/dL (1/28/2022)  A1C: No results in last 5 years (No results in last 5 years)      Cholesterol Screening Once every 5 years if you don't have a lipid disorder. May order more often based on risk factors. Lipid panel: 08/01/2023          Other Preventive Screenings Covered by Medicare:  Abdominal Aortic Aneurysm (AAA) Screening: covered once if your at risk. You're considered to be at risk if you have a family history of AAA.  Lung Cancer Screening: covers low dose CT scan once per year if you meet all of the following conditions: (1) Age 55-77; (2) No signs or symptoms of lung cancer; (3) Current smoker or have quit smoking within the last 15 years; (4) You have a tobacco smoking history of at least 20 pack years (packs per day multiplied by number of years you smoked); (5) You get a written order from a healthcare provider.  Glaucoma Screening: covered annually if you're considered high risk: (1) You have diabetes OR (2) Family history of glaucoma OR (3)  aged 50 and older OR (4)  American aged 65 and older  Osteoporosis Screening: covered every 2 years if you meet one of the following conditions: (1) You're estrogen deficient and at risk for osteoporosis based off medical history and other findings; (2) Have a vertebral abnormality; (3) On glucocorticoid therapy for more than 3 months; (4) Have primary hyperparathyroidism; (5) On osteoporosis medications and need to assess response to drug therapy.   Last bone density test (DXA Scan): 02/24/2023.  HIV Screening: covered annually if you're between the age of 15-65. Also covered annually if you are younger than 15 and older than 65 with risk factors for HIV infection. For pregnant patients, it is covered up to 3 times per pregnancy.    Immunizations:  Immunization Recommendations   Influenza Vaccine Annual  influenza vaccination during flu season is recommended for all persons aged >= 6 months who do not have contraindications   Pneumococcal Vaccine   * Pneumococcal conjugate vaccine = PCV13 (Prevnar 13), PCV15 (Vaxneuvance), PCV20 (Prevnar 20)  * Pneumococcal polysaccharide vaccine = PPSV23 (Pneumovax) Adults 19-65 yo with certain risk factors or if 65+ yo  If never received any pneumonia vaccine: recommend Prevnar 20 (PCV20)  Give PCV20 if previously received 1 dose of PCV13 or PPSV23   Hepatitis B Vaccine 3 dose series if at intermediate or high risk (ex: diabetes, end stage renal disease, liver disease)   Respiratory syncytial virus (RSV) Vaccine - COVERED BY MEDICARE PART D  * RSVPreF3 (Arexvy) CDC recommends that adults 60 years of age and older may receive a single dose of RSV vaccine using shared clinical decision-making (SCDM)   Tetanus (Td) Vaccine - COST NOT COVERED BY MEDICARE PART B Following completion of primary series, a booster dose should be given every 10 years to maintain immunity against tetanus. Td may also be given as tetanus wound prophylaxis.   Tdap Vaccine - COST NOT COVERED BY MEDICARE PART B Recommended at least once for all adults. For pregnant patients, recommended with each pregnancy.   Shingles Vaccine (Shingrix) - COST NOT COVERED BY MEDICARE PART B  2 shot series recommended in those 19 years and older who have or will have weakened immune systems or those 50 years and older     Health Maintenance Due:      Topic Date Due   • Breast Cancer Screening: Mammogram  10/31/2024   • Colorectal Cancer Screening  12/20/2024   • Hepatitis C Screening  Completed     Immunizations Due:      Topic Date Due   • Pneumococcal Vaccine: 65+ Years (2 - PPSV23 or PCV20) 10/29/2017   • Influenza Vaccine (1) 09/01/2023   • COVID-19 Vaccine (4 - 2023-24 season) 09/01/2023     Advance Directives   What are advance directives?  Advance directives are legal documents that state your wishes and plans for medical  care. These plans are made ahead of time in case you lose your ability to make decisions for yourself. Advance directives can apply to any medical decision, such as the treatments you want, and if you want to donate organs.   What are the types of advance directives?  There are many types of advance directives, and each state has rules about how to use them. You may choose a combination of any of the following:  Living will:  This is a written record of the treatment you want. You can also choose which treatments you do not want, which to limit, and which to stop at a certain time. This includes surgery, medicine, IV fluid, and tube feedings.   Durable power of  for healthcare (DPAHC):  This is a written record that states who you want to make healthcare choices for you when you are unable to make them for yourself. This person, called a proxy, is usually a family member or a friend. You may choose more than 1 proxy.  Do not resuscitate (DNR) order:  A DNR order is used in case your heart stops beating or you stop breathing. It is a request not to have certain forms of treatment, such as CPR. A DNR order may be included in other types of advance directives.  Medical directive:  This covers the care that you want if you are in a coma, near death, or unable to make decisions for yourself. You can list the treatments you want for each condition. Treatment may include pain medicine, surgery, blood transfusions, dialysis, IV or tube feedings, and a ventilator (breathing machine).  Values history:  This document has questions about your views, beliefs, and how you feel and think about life. This information can help others choose the care that you would choose.  Why are advance directives important?  An advance directive helps you control your care. Although spoken wishes may be used, it is better to have your wishes written down. Spoken wishes can be misunderstood, or not followed. Treatments may be given even if  you do not want them. An advance directive may make it easier for your family to make difficult choices about your care.   Weight Management   Why it is important to manage your weight:  Being overweight increases your risk of health conditions such as heart disease, high blood pressure, type 2 diabetes, and certain types of cancer. It can also increase your risk for osteoarthritis, sleep apnea, and other respiratory problems. Aim for a slow, steady weight loss. Even a small amount of weight loss can lower your risk of health problems.  How to lose weight safely:  A safe and healthy way to lose weight is to eat fewer calories and get regular exercise. You can lose up about 1 pound a week by decreasing the number of calories you eat by 500 calories each day.   Healthy meal plan for weight management:  A healthy meal plan includes a variety of foods, contains fewer calories, and helps you stay healthy. A healthy meal plan includes the following:  Eat whole-grain foods more often.  A healthy meal plan should contain fiber. Fiber is the part of grains, fruits, and vegetables that is not broken down by your body. Whole-grain foods are healthy and provide extra fiber in your diet. Some examples of whole-grain foods are whole-wheat breads and pastas, oatmeal, brown rice, and bulgur.  Eat a variety of vegetables every day.  Include dark, leafy greens such as spinach, kale, jere greens, and mustard greens. Eat yellow and orange vegetables such as carrots, sweet potatoes, and winter squash.   Eat a variety of fruits every day.  Choose fresh or canned fruit (canned in its own juice or light syrup) instead of juice. Fruit juice has very little or no fiber.  Eat low-fat dairy foods.  Drink fat-free (skim) milk or 1% milk. Eat fat-free yogurt and low-fat cottage cheese. Try low-fat cheeses such as mozzarella and other reduced-fat cheeses.  Choose meat and other protein foods that are low in fat.  Choose beans or other legumes  such as split peas or lentils. Choose fish, skinless poultry (chicken or turkey), or lean cuts of red meat (beef or pork). Before you cook meat or poultry, cut off any visible fat.   Use less fat and oil.  Try baking foods instead of frying them. Add less fat, such as margarine, sour cream, regular salad dressing and mayonnaise to foods. Eat fewer high-fat foods. Some examples of high-fat foods include french fries, doughnuts, ice cream, and cakes.  Eat fewer sweets.  Limit foods and drinks that are high in sugar. This includes candy, cookies, regular soda, and sweetened drinks.  Exercise:  Exercise at least 30 minutes per day on most days of the week. Some examples of exercise include walking, biking, dancing, and swimming. You can also fit in more physical activity by taking the stairs instead of the elevator or parking farther away from stores. Ask your healthcare provider about the best exercise plan for you.      © Copyright [employer] 2018 Information is for End User's use only and may not be sold, redistributed or otherwise used for commercial purposes. All illustrations and images included in CareNotes® are the copyrighted property of [employer] Inc[employer] or [employer]

## 2023-12-20 NOTE — PROGRESS NOTES
Assessment and Plan:    Medicare annual wellness visit, subsequent  PMHx includes breast cancer s/p chemotherapy and radiation, HLD.  Doing well overall.  Follow-up labs ordered in August 2023, pending collection.  Patient to get blood work prior to next visit.    Bursitis of left hip  Has been undergoing physical therapy, reports marked improvement.  Reordered physical therapy.     Subjective:     Patient ID: Kimberly Shine is a 73 y.o. female.    She presents to the office today for her annual wellness exam.  PMHx includes history of breast cancer and HLD.  She is in good health and is up-to-date on her colonoscopy and mammogram.  She is in good spirits and is ready for the holiday season.  She denies any symptoms of infection, nor any changes in weight or bowels.  She has agreed to get her flu shot today.      Review of Systems   Constitutional:  Negative for chills, fatigue and fever.   Respiratory:  Negative for cough and shortness of breath.    Cardiovascular:  Negative for chest pain, palpitations and leg swelling.   Gastrointestinal:  Negative for abdominal pain, diarrhea, nausea and vomiting.   Neurological:  Negative for dizziness, light-headedness and headaches.   All other systems reviewed and are negative.       Patient Active Problem List   Diagnosis    Lumbosacral spondylosis without myelopathy    Chemotherapy-induced neuropathy     Malignant neoplasm of upper-outer quadrant of left breast in female, estrogen receptor negative     Pure hypercholesterolemia    Carpal tunnel syndrome    Edema of both lower extremities due to peripheral venous insufficiency    Situational stress    Elevated glucose    Medicare annual wellness visit, subsequent    Bursitis of left hip        Current Outpatient Medications   Medication Sig Dispense Refill    aspirin 81 MG tablet Take 81 mg by mouth daily        Biotin 1 MG CAPS Take by mouth      Cholecalciferol 50 MCG (2000 UT) CAPS Take 1 capsule by mouth daily       Cyanocobalamin ER 1000 MCG TBCR Take 1 tablet by mouth daily      Menaquinone-7 (VITAMIN K2 PO) Take by mouth      Multiple Vitamin (MULTI-VITAMINS PO) Take 1 tablet by mouth daily      Omega-3 Fatty Acids (FISH OIL MAXIMUM STRENGTH) 1200 MG CPDR Take 2 capsules by mouth daily      omeprazole (PriLOSEC) 20 mg delayed release capsule TAKE 1 CAPSULE BY MOUTH DAILY TAKE 20 MINUTES PRIOR TO BREAKFAST DAILY FOR 30 DAYS THEN DISCONTINUE 90 capsule 1    Red Yeast Rice Extract 600 MG TABS Take 1 tablet by mouth daily      vitamin E 100 UNIT capsule Take 100 Units by mouth daily        valACYclovir (VALTREX) 1,000 mg tablet Take 1 tablet (1,000 mg total) by mouth 3 (three) times a day for 7 days 21 tablet 0     No current facility-administered medications for this visit.        Allergies   Allergen Reactions    Benzalkonium Chloride Rash    Neomycin-Bacitracin Zn-Polymyx Rash    Other Hives     Apples, pears, peaches     Pollen Extract Allergic Rhinitis        The following portions of the patient's history were reviewed and updated as appropriate: allergies, current medications, past family history, past medical history, past social history, past surgical history and problem list.          Objective:    /78   Pulse 89   Temp (!) 97.4 °F (36.3 °C)   Wt 73.9 kg (163 lb)   SpO2 100%   BMI 29.81 kg/m²      Body mass index is 29.81 kg/m².     Physical Exam  Vitals reviewed.   Constitutional:       General: She is not in acute distress.     Appearance: Normal appearance. She is well-developed. She is not ill-appearing.   HENT:      Head: Normocephalic and atraumatic.   Eyes:      General: No scleral icterus.     Conjunctiva/sclera: Conjunctivae normal.   Cardiovascular:      Rate and Rhythm: Normal rate and regular rhythm.      Pulses: Normal pulses.      Heart sounds: Normal heart sounds.   Pulmonary:      Effort: Pulmonary effort is normal. No respiratory distress.      Breath sounds: Normal breath sounds. No  wheezing or rales.   Chest:      Chest wall: No tenderness.   Abdominal:      General: Bowel sounds are normal. There is no distension.      Palpations: Abdomen is soft.      Tenderness: There is no abdominal tenderness.   Musculoskeletal:         General: No tenderness.   Skin:     General: Skin is warm and dry.   Neurological:      Mental Status: She is alert.   Psychiatric:         Mood and Affect: Mood normal.         Behavior: Behavior normal.         Thought Content: Thought content normal.         Judgment: Judgment normal.

## 2023-12-20 NOTE — PROGRESS NOTES
Assessment and Plan:     Problem List Items Addressed This Visit       Chemotherapy-induced neuropathy     Malignant neoplasm of upper-outer quadrant of left breast in female, estrogen receptor negative     Pure hypercholesterolemia    Carpal tunnel syndrome    Edema of both lower extremities due to peripheral venous insufficiency    Situational stress    Elevated glucose     Other Visit Diagnoses       Medicare annual wellness visit, subsequent    -  Primary             Preventive health issues were discussed with patient, and age appropriate screening tests were ordered as noted in patient's After Visit Summary.  Personalized health advice and appropriate referrals for health education or preventive services given if needed, as noted in patient's After Visit Summary.     History of Present Illness:     Patient presents for a Medicare Wellness Visit    HPI   Patient Care Team:  Reddy Choudhary MD as PCP - General (Internal Medicine)     Review of Systems:     Review of Systems     Problem List:     Patient Active Problem List   Diagnosis    Lumbosacral spondylosis without myelopathy    Chemotherapy-induced neuropathy     Malignant neoplasm of upper-outer quadrant of left breast in female, estrogen receptor negative     Pure hypercholesterolemia    Carpal tunnel syndrome    Edema of both lower extremities due to peripheral venous insufficiency    Situational stress    Elevated glucose      Past Medical and Surgical History:     Past Medical History:   Diagnosis Date    Cancer (HCC)     left breast, lumpectomy,with chemo and radiation    Hyperlipidemia      Past Surgical History:   Procedure Laterality Date    BREAST SURGERY Left     lumpectomy    CATARACT EXTRACTION      last assessed 3/21/16    COLONOSCOPY      NEUROPLASTY / TRANSPOSITION MEDIAN NERVE AT CARPAL TUNNEL      last assessed 3/21/16     TONSILLECTOMY      TOOTH EXTRACTION      last assessed 3/21/16      Family History:     Family History   Problem  Relation Age of Onset    Diabetes Paternal Aunt       Social History:     Social History     Socioeconomic History    Marital status: /Civil Union     Spouse name: Not on file    Number of children: Not on file    Years of education: Not on file    Highest education level: Not on file   Occupational History    Not on file   Tobacco Use    Smoking status: Never    Smokeless tobacco: Never   Vaping Use    Vaping status: Never Used   Substance and Sexual Activity    Alcohol use: Not Currently     Comment: rarely     Drug use: No    Sexual activity: Not on file   Other Topics Concern    Not on file   Social History Narrative    Consumes 1 cup of coffee per day     Social Determinants of Health     Financial Resource Strain: Not on file   Food Insecurity: Not on file   Transportation Needs: Not on file   Physical Activity: Not on file   Stress: Not on file   Social Connections: Not on file   Intimate Partner Violence: Not on file   Housing Stability: Not on file      Medications and Allergies:     Current Outpatient Medications   Medication Sig Dispense Refill    aspirin 81 MG tablet Take 81 mg by mouth daily        Biotin 1 MG CAPS Take by mouth      Cholecalciferol 50 MCG (2000 UT) CAPS Take 1 capsule by mouth daily      Cyanocobalamin ER 1000 MCG TBCR Take 1 tablet by mouth daily      Menaquinone-7 (VITAMIN K2 PO) Take by mouth      Multiple Vitamin (MULTI-VITAMINS PO) Take 1 tablet by mouth daily      Omega-3 Fatty Acids (FISH OIL MAXIMUM STRENGTH) 1200 MG CPDR Take 2 capsules by mouth daily      omeprazole (PriLOSEC) 20 mg delayed release capsule TAKE 1 CAPSULE BY MOUTH DAILY TAKE 20 MINUTES PRIOR TO BREAKFAST DAILY FOR 30 DAYS THEN DISCONTINUE 90 capsule 1    Red Yeast Rice Extract 600 MG TABS Take 1 tablet by mouth daily      valACYclovir (VALTREX) 1,000 mg tablet Take 1 tablet (1,000 mg total) by mouth 3 (three) times a day for 7 days 21 tablet 0    vitamin E 100 UNIT capsule Take 100 Units by mouth daily          No current facility-administered medications for this visit.     Allergies   Allergen Reactions    Benzalkonium Chloride Rash    Neomycin-Bacitracin Zn-Polymyx Rash    Other Hives     Apples, pears, peaches     Pollen Extract Allergic Rhinitis      Immunizations:     Immunization History   Administered Date(s) Administered    COVID-19 MODERNA VACC 0.5 ML IM 01/19/2021, 02/17/2021, 12/24/2021    INFLUENZA 10/16/2018, 10/16/2018, 10/16/2018, 10/01/2021, 10/01/2021, 10/01/2021    Influenza Split High Dose Preservative Free IM 10/29/2016    Influenza, high dose seasonal 0.7 mL 10/02/2020    Pneumococcal Conjugate 13-Valent 10/29/2016, 10/29/2016    Tdap 09/11/2020, 09/11/2020, 09/11/2020, 09/11/2020    Zoster 03/21/2016, 03/21/2016, 03/21/2016, 03/21/2016      Health Maintenance:         Topic Date Due    Breast Cancer Screening: Mammogram  10/31/2024    Colorectal Cancer Screening  12/20/2024    Hepatitis C Screening  Completed         Topic Date Due    Pneumococcal Vaccine: 65+ Years (2 - PPSV23 or PCV20) 10/29/2017    Influenza Vaccine (1) 09/01/2023    COVID-19 Vaccine (4 - 2023-24 season) 09/01/2023      Medicare Screening Tests and Risk Assessments:     Kimberly is here for her Subsequent Wellness visit.     Health Risk Assessment:   Patient rates overall health as very good. Patient feels that their physical health rating is same. Patient is satisfied with their life. Eyesight was rated as same. Hearing was rated as same. Patient feels that their emotional and mental health rating is same. Patients states they are never, rarely angry. Patient states they are sometimes unusually tired/fatigued. Pain experienced in the last 7 days has been some. Patient's pain rating has been 5/10. Patient states that she has experienced no weight loss or gain in last 6 months.     Fall Risk Screening:   In the past year, patient has experienced: no history of falling in past year      Urinary Incontinence Screening:    Patient has not leaked urine accidently in the last six months.     Home Safety:  Patient does not have trouble with stairs inside or outside of their home. Patient has working smoke alarms and has working carbon monoxide detector. Home safety hazards include: none.     Nutrition:   Current diet is Regular.     Medications:   Patient is currently taking over-the-counter supplements. OTC medications include: see medication list. Patient is able to manage medications.     Activities of Daily Living (ADLs)/Instrumental Activities of Daily Living (IADLs):   Walk and transfer into and out of bed and chair?: Yes  Dress and groom yourself?: Yes    Bathe or shower yourself?: Yes    Feed yourself? Yes  Do your laundry/housekeeping?: Yes  Manage your money, pay your bills and track your expenses?: Yes  Make your own meals?: Yes    Do your own shopping?: Yes    Previous Hospitalizations:   Any hospitalizations or ED visits within the last 12 months?: No      Advance Care Planning:   Living will: Yes    Advanced directive: Yes      PREVENTIVE SCREENINGS      Cardiovascular Screening:    General: Screening Not Indicated and History Lipid Disorder      Diabetes Screening:     General: Screening Current      Colorectal Cancer Screening:     General: Screening Current      Breast Cancer Screening:     General: History Breast Cancer      Cervical Cancer Screening:    General: Screening Not Indicated      Lung Cancer Screening:     General: Screening Not Indicated      Hepatitis C Screening:    General: Screening Current    Screening, Brief Intervention, and Referral to Treatment (SBIRT)    Screening  Typical number of drinks in a day: 0  Typical number of drinks in a week: 0  Interpretation: Low risk drinking behavior.    Single Item Drug Screening:  How often have you used an illegal drug (including marijuana) or a prescription medication for non-medical reasons in the past year? never    Single Item Drug Screen Score:  0  Interpretation: Negative screen for possible drug use disorder    No results found.     Physical Exam:     There were no vitals taken for this visit.    Physical Exam     Reddy Choudhary MD

## 2023-12-20 NOTE — ASSESSMENT & PLAN NOTE
Alternate Motrin with Tylenol as needed for pain control, follow the Derm[facility] instructions, attached below. Follow-up with pediatrician if not better. If any new/acute symptoms or worsening of current presentation please go to the closest urgent care or emergency room for prompt reevaluation. Has been undergoing physical therapy, reports marked improvement.  Reordered physical therapy.

## 2024-02-18 PROBLEM — Z00.00 MEDICARE ANNUAL WELLNESS VISIT, SUBSEQUENT: Status: RESOLVED | Noted: 2023-12-20 | Resolved: 2024-02-18

## 2024-03-12 ENCOUNTER — TELEPHONE (OUTPATIENT)
Dept: INTERNAL MEDICINE CLINIC | Facility: CLINIC | Age: 74
End: 2024-03-12

## 2024-03-12 DIAGNOSIS — R05.1 ACUTE COUGH: Primary | ICD-10-CM

## 2024-03-12 RX ORDER — PREDNISONE 10 MG/1
10 TABLET ORAL DAILY
Qty: 5 TABLET | Refills: 0 | Status: SHIPPED | OUTPATIENT
Start: 2024-03-12 | End: 2024-03-12

## 2024-03-12 NOTE — TELEPHONE ENCOUNTER
Pt is having a lingering dry cough for about 5 days, states the Benzonatate 200mg that was prescribed is not helping. Pt would like to know if something else can be prescribed to help relive this.

## 2024-03-24 ENCOUNTER — NURSE TRIAGE (OUTPATIENT)
Dept: OTHER | Facility: OTHER | Age: 74
End: 2024-03-24

## 2024-03-24 NOTE — TELEPHONE ENCOUNTER
"Answer Assessment - Initial Assessment Questions  1. SYMPTOM: \"What's the main symptom you're concerned about?\" (e.g., pain, itching, dryness)      Redness, burning, and itching    2. LOCATION: \"Where is the symptoms located?\" (e.g., inside/outside, left/right)      Vaginal area    3. ONSET: \"When did the  symptoms  start?\"      Wednesday    4. PAIN: \"Is there any pain?\" If Yes, ask: \"How bad is it?\" (Scale: 1-10; mild, moderate, severe)      7-8/10    5. ITCHING: \"Is there any itching?\" If Yes, ask: \"How bad is it?\" (Scale: 1-10; mild, moderate, severe)      7-8/10    6. CAUSE: \"What do you think is causing the discharge?\" \"Have you had the same problem before? What happened then?\"      Unsure    7. OTHER SYMPTOMS: \"Do you have any other symptoms?\" (e.g., fever, itching, vaginal bleeding, pain with urination, injury to genital area, vaginal foreign body)      No other symptoms. Has tried sitz bath, vaseline, and other OTC ointments with no relief.    Protocols used: Vaginal Symptoms-ADULT-AH      Per the on call provider, Diflucan sent to pharmacy. Home care advice given. Patient verbalized understanding.   "

## 2024-03-24 NOTE — TELEPHONE ENCOUNTER
Reason for Disposition  • MODERATE-SEVERE itching (i.e., interferes with school, work, or sleep)    Protocols used: Vaginal Symptoms-ADULT-AH

## 2024-03-24 NOTE — TELEPHONE ENCOUNTER
"Regarding: Vaginal burning/ redness  ----- Message from Digna Mckeon sent at 3/24/2024  9:29 AM EDT -----  Pt stated, \" I tried to wait until tomorrow. I just finished my antibiotic, I am having a reaction at the bottom of my vagina it is red an burning. I think I need an ointment.\"    "

## 2024-03-24 NOTE — TELEPHONE ENCOUNTER
"Reason for Disposition  • [1] Localized rash is very painful AND [2] no fever    Answer Assessment - Initial Assessment Questions  1. APPEARANCE of RASH: \"Describe the rash.\"       In vaginal area, red and painful  2. LOCATION: \"Where is the rash located?\"       In vaginal area   3. NUMBER: \"How many spots are there?\"       Big red area   5. ONSET: \"When did the rash start?\"       Rash started Thursday   6. ITCHING: \"Does the rash itch?\" If Yes, ask: \"How bad is the itch?\"  (Scale 1-10; or mild, moderate, severe)      Itches awful and is very painful. Took the Diflucan but needs something topical for relief   7. PAIN: \"Does the rash hurt?\" If Yes, ask: \"How bad is the pain?\"  (Scale 1-10; or mild, moderate, severe)      severe  8. OTHER SYMPTOMS: \"Do you have any other symptoms?\" (e.g., fever)      Itching like crazy    Protocols used: Rash or Redness - Localized-ADULT-AH    "

## 2024-03-24 NOTE — TELEPHONE ENCOUNTER
"Regarding: red/swollen bottom/may need an ointment  ----- Message from Susan Hood sent at 3/24/2024  1:44 PM EDT -----  Pt called \"I just finished an antibiotic for a yeast infection and now my whole bottom is red and swollen to where I can't even sit down. I think I need an ointment.\"    "

## 2024-03-24 NOTE — TELEPHONE ENCOUNTER
Pt called earlier regarding yeast infection. Diflucan was prescribed and she took it at 1130 and reports some relief with internal symptoms but states externally her skin is skin red, itchy and very painful.  She is requesting a topical medication to soothe external symptoms.  Advice offered per protocol and per oncall provider who prescribed Nystatin cream to be applied twice daily.  Pt verbalized understanding.

## 2024-03-26 ENCOUNTER — TELEPHONE (OUTPATIENT)
Dept: INTERNAL MEDICINE CLINIC | Facility: CLINIC | Age: 74
End: 2024-03-26

## 2024-03-26 DIAGNOSIS — B36.9 FUNGAL DERMATITIS: Primary | ICD-10-CM

## 2024-03-26 RX ORDER — FLUCONAZOLE 100 MG/1
TABLET ORAL
Qty: 2 TABLET | Refills: 0 | Status: SHIPPED | OUTPATIENT
Start: 2024-03-26 | End: 2024-03-28

## 2024-03-26 NOTE — TELEPHONE ENCOUNTER
Pt left a vm requesting a return phone call in regards to the reaction she got from taking antibiotic.

## 2024-05-15 ENCOUNTER — TELEPHONE (OUTPATIENT)
Age: 74
End: 2024-05-15

## 2024-07-03 ENCOUNTER — TELEPHONE (OUTPATIENT)
Age: 74
End: 2024-07-03

## 2024-07-03 NOTE — TELEPHONE ENCOUNTER
Left voicemail for pt to see which [facility] she goes to and to see if she has the fax number for them

## 2024-07-03 NOTE — TELEPHONE ENCOUNTER
Kimberly is at the [facility] office and would need lab scripts to be sent over to the office asap. I called office but  no answer. Please call patient when complete. Fax #: [phone]

## 2024-07-04 LAB
BUN SERPL-MCNC: 15 MG/DL (ref 8–27)
BUN/CREAT SERPL: 18 (ref 12–28)
CALCIUM SERPL-MCNC: 9.4 MG/DL (ref 8.7–10.3)
CHLORIDE SERPL-SCNC: 102 MMOL/L (ref 96–106)
CHOLEST SERPL-MCNC: 243 MG/DL (ref 100–199)
CO2 SERPL-SCNC: 24 MMOL/L (ref 20–29)
CREAT SERPL-MCNC: 0.85 MG/DL (ref 0.57–1)
EGFR: 72 ML/MIN/1.73
GLUCOSE SERPL-MCNC: 98 MG/DL (ref 70–99)
HBA1C MFR BLD: 5.9 % (ref 4.8–5.6)
HDLC SERPL-MCNC: 61 MG/DL
LDLC SERPL CALC-MCNC: 166 MG/DL (ref 0–99)
POTASSIUM SERPL-SCNC: 4.2 MMOL/L (ref 3.5–5.2)
SL AMB VLDL CHOLESTEROL CALC: 16 MG/DL (ref 5–40)
SODIUM SERPL-SCNC: 140 MMOL/L (ref 134–144)
TRIGL SERPL-MCNC: 91 MG/DL (ref 0–149)

## 2024-07-05 DIAGNOSIS — E78.00 PURE HYPERCHOLESTEROLEMIA: Primary | ICD-10-CM

## 2024-07-05 DIAGNOSIS — R73.03 PREDIABETES: ICD-10-CM

## 2024-07-05 PROBLEM — R73.09 ELEVATED GLUCOSE: Status: RESOLVED | Noted: 2023-12-20 | Resolved: 2024-07-05

## 2024-07-05 RX ORDER — ROSUVASTATIN CALCIUM 5 MG/1
5 TABLET, COATED ORAL DAILY
Qty: 30 TABLET | Refills: 5 | Status: SHIPPED | OUTPATIENT
Start: 2024-07-05

## 2024-07-05 NOTE — RESULT ENCOUNTER NOTE
Derek Harris,  Your labs show that your cholesterol is increased.  While you still have a high HDL cholesterol, which is favorable, your LDL cholesterol has increased from 1 35-1 66.  In addition, your labs show you have mild prediabetes.  Prediabetes is not the same as diabetes, and your risk for progressing to diabetes is low.      I recommend starting cholesterol-lowering medicine at this time.  I will send in a prescription for rosuvastatin 5 mg/day.  This is generally very well-tolerated.  I will enter an order for repeat lipid panel, metabolic panel and hemoglobin A1c for 3 months.  Please be careful about your intake of complex carbohydrates, fried foods and fatty foods, and reduce these.  Also, staying physically active will improve your lipid metabolism and sugar metabolism.

## 2024-07-30 DIAGNOSIS — E78.00 PURE HYPERCHOLESTEROLEMIA: ICD-10-CM

## 2024-07-30 RX ORDER — ROSUVASTATIN CALCIUM 5 MG/1
5 TABLET, COATED ORAL DAILY
Qty: 100 TABLET | Refills: 1 | Status: SHIPPED | OUTPATIENT
Start: 2024-07-30

## 2024-08-29 ENCOUNTER — TELEPHONE (OUTPATIENT)
Age: 74
End: 2024-08-29

## 2024-08-29 NOTE — TELEPHONE ENCOUNTER
Patient called asking that active labs orders be sent over to "[facility] so she can get them done in a few weeks.  Please send labs to [facility].

## 2024-09-04 NOTE — TELEPHONE ENCOUNTER
Patient returned call - [facility] address is [address] [place] [address].Please fax lab orders.

## 2024-10-14 ENCOUNTER — TELEPHONE (OUTPATIENT)
Age: 74
End: 2024-10-14

## 2024-10-18 ENCOUNTER — OFFICE VISIT (OUTPATIENT)
Age: 74
End: 2024-10-18
Payer: COMMERCIAL

## 2024-10-18 VITALS
BODY MASS INDEX: 30.36 KG/M2 | HEART RATE: 72 BPM | SYSTOLIC BLOOD PRESSURE: 122 MMHG | DIASTOLIC BLOOD PRESSURE: 76 MMHG | WEIGHT: 165 LBS | TEMPERATURE: 97.5 F | HEIGHT: 62 IN | RESPIRATION RATE: 18 BRPM | OXYGEN SATURATION: 93 %

## 2024-10-18 DIAGNOSIS — G72.9 MYOPATHY: Primary | ICD-10-CM

## 2024-10-18 DIAGNOSIS — R73.03 PREDIABETES: ICD-10-CM

## 2024-10-18 DIAGNOSIS — E78.00 PURE HYPERCHOLESTEROLEMIA: ICD-10-CM

## 2024-10-18 PROBLEM — K21.00 GASTROESOPHAGEAL REFLUX DISEASE WITH ESOPHAGITIS WITHOUT HEMORRHAGE: Status: ACTIVE | Noted: 2024-10-18

## 2024-10-18 LAB
BUN SERPL-MCNC: 12 MG/DL (ref 8–27)
BUN/CREAT SERPL: 14 (ref 12–28)
CALCIUM SERPL-MCNC: 9.8 MG/DL (ref 8.7–10.3)
CHLORIDE SERPL-SCNC: 102 MMOL/L (ref 96–106)
CHOLEST SERPL-MCNC: 230 MG/DL (ref 100–199)
CO2 SERPL-SCNC: 26 MMOL/L (ref 20–29)
CREAT SERPL-MCNC: 0.87 MG/DL (ref 0.57–1)
EGFR: 70 ML/MIN/1.73
GLUCOSE SERPL-MCNC: 102 MG/DL (ref 70–99)
HBA1C MFR BLD: 6.3 % (ref 4.8–5.6)
HDLC SERPL-MCNC: 69 MG/DL
LDLC SERPL CALC-MCNC: 147 MG/DL (ref 0–99)
POTASSIUM SERPL-SCNC: 4.1 MMOL/L (ref 3.5–5.2)
SL AMB VLDL CHOLESTEROL CALC: 14 MG/DL (ref 5–40)
SODIUM SERPL-SCNC: 140 MMOL/L (ref 134–144)
TRIGL SERPL-MCNC: 79 MG/DL (ref 0–149)

## 2024-10-18 PROCEDURE — 99214 OFFICE O/P EST MOD 30 MIN: CPT | Performed by: INTERNAL MEDICINE

## 2024-10-18 PROCEDURE — G2211 COMPLEX E/M VISIT ADD ON: HCPCS | Performed by: INTERNAL MEDICINE

## 2024-10-18 NOTE — PROGRESS NOTES
INTERNAL MEDICINE OFFICE VISIT       NAME: Kimberly Shine  AGE: 74 y.o. SEX: female       : 1950        MRN: [id number]    DATE: 10/18/2024  TIME: 10:23 AM    Assessment and Plan   1. Myopathy  2. Pure hypercholesterolemia  -     Lipid panel  3. Prediabetes  -     Basic metabolic panel  -     Hemoglobin A1C  -     metFORMIN (GLUCOPHAGE) 500 mg tablet; Take 1 tablet (500 mg total) by mouth daily with breakfast       Follow-up will be in 3 months and lab work ordered today will be performed 1 week prior to this.        Chief Complaint   Muscle pain on rosuvastatin    History of Present Illness   Kimberly Shine is a 74 y.o.-year-old female who stop rosuvastatin 2 weeks ago after she developed severe pain in her legs and some weakness.  Symptoms are starting to resolve.  She still has some gait disturbance.    She recently saw orthopedics and had a left trochanteric bursal injection of her hip which improved this pain.  They also noticed her gait disturbance and recommended physical therapy.  This has already been ordered and Kimberly agrees to proceed.    We discussed never using statins again.  This is more than the usual mild myopathic symptomatology and may have been a myositis.    Kimberly has prediabetes and hyperlipidemia as mentioned above.  Diet is suboptimal and she is not exercising regularly.    We discussed about lifestyle and recent labs of  including increase in hemoglobin A1c to 6.3, just below the threshold for diabetes, with blood sugar 102, total cholesterol 230,  and HDL 69.    We discussed her diet at length which is high in concentrated sweets and complex carbohydrates.  She agrees to limiting concentrated sweets and decrease complex carbohydrates especially bread.    I recommend starting metformin 500 mg daily with breakfast, explaining potential benefits, side effects and precautions including the possibility weight reduction with decreased appetite caused by this medicine.   We discussed the possibility that weight reduction, improve glucose metabolism can also treat hyperlipidemia.  We discussed goals for LDL, including keeping LDL under 140 due to high HDL as a reasonable target.      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    Objective     Vitals:    10/18/24 0921   BP: 122/76   Pulse: 72   Resp: 18   Temp: 97.5 °F (36.4 °C)   SpO2: 93%     Wt Readings from Last 3 Encounters:   10/18/24 74.8 kg (165 lb)   04/19/24 74.8 kg (165 lb)   12/20/23 73.9 kg (163 lb)       Physical Exam    Vital signs stable, alert and oriented no distress in good spirits.  Pulse regular.  Lungs clear the cardiac exam is normal and auscultation.  There is mild lower leg weakness without focality, no muscle swelling, tenderness and no evidence of a dermatitis.  Gait is stable overall and subjectively there is some slight weakness.  There is no sensory deficit.  Peripheral circulation intact.  There is no edema.    Pertinent Laboratory/Diagnostic Studies:        Orders Placed This Encounter   Procedures    Basic metabolic panel    Hemoglobin A1C    Lipid panel       ALLERGIES:  Allergies   Allergen Reactions    Rosuvastatin Myalgia    Benzalkonium Chloride  Rash    Neomycin-Bacitracin Zn-Polymyx Rash    Other Hives     Apples, pears, peaches     Pollen Extract Allergic Rhinitis       Current Medications     Current Outpatient Medications   Medication Sig Dispense Refill    aspirin 81 MG tablet Take 81 mg by mouth daily        Biotin 1 MG CAPS Take by mouth      Cholecalciferol 50 MCG (2000 UT) CAPS Take 1 capsule by mouth daily      Cyanocobalamin ER 1000 MCG TBCR Take 1 tablet by mouth daily      Menaquinone-7 (VITAMIN K2 PO) Take by mouth      metFORMIN (GLUCOPHAGE) 500 mg tablet Take 1 tablet (500 mg total) by mouth daily with breakfast 30 tablet 3    Multiple Vitamin (MULTI-VITAMINS PO) Take 1 tablet by mouth daily      Omega-3 Fatty Acids (FISH OIL MAXIMUM STRENGTH) 1200 MG CPDR Take 2 capsules by mouth daily      omeprazole (PriLOSEC) 20 mg delayed release capsule TAKE 1 CAPSULE BY MOUTH DAILY TAKE 20 MINUTES PRIOR TO BREAKFAST DAILY FOR 30 DAYS THEN DISCONTINUE 90 capsule 1    Red Yeast Rice Extract 600 MG TABS Take 1 tablet by mouth daily      vitamin E 100 UNIT capsule Take 100 Units by mouth daily        nystatin (MYCOSTATIN) cream Apply topically 2 (two) times a day for 7 days 30 g 0    valACYclovir (VALTREX) 1,000 mg tablet Take 1 tablet (1,000 mg total) by mouth 3 (three) times a day for 7 days 21 tablet 0     No current facility-administered medications for this visit.         Health Maintenance        Reddy Choudhary MD

## 2024-11-06 ENCOUNTER — TELEPHONE (OUTPATIENT)
Age: 74
End: 2024-11-06

## 2024-11-06 NOTE — TELEPHONE ENCOUNTER
Patient call about about the medication   metFORMIN (GLUCOPHAGE) 500 mg tablet . She did not like the way it made her feel Patient still has the headache still ans is not working at all. Patient would like to know what else to do. Please reach out to the patient. Thank you

## 2024-12-28 NOTE — PROGRESS NOTES
Ms. Kimberly Shine is a 74-year-old female with a past medical history of bilateral lower extremity edema due to peripheral venous insufficiency, GERD, chemotherapy-induced neuropathies, history of malignant neoplasm of upper-outer quadrant of left breast, dyslipidemia.  Patient presents to clinic today accompanied by her  who is also a patient for Medicare annual wellness visit. Patient recently had BMP, lipid panel, and A1c performed in October 2024.  A1c is increased to 6.3%.

## 2024-12-30 ENCOUNTER — OFFICE VISIT (OUTPATIENT)
Age: 74
End: 2024-12-30
Payer: COMMERCIAL

## 2024-12-30 VITALS
BODY MASS INDEX: 29 KG/M2 | OXYGEN SATURATION: 97 % | WEIGHT: 157.6 LBS | HEIGHT: 62 IN | RESPIRATION RATE: 15 BRPM | HEART RATE: 77 BPM

## 2024-12-30 DIAGNOSIS — Z00.00 MEDICARE ANNUAL WELLNESS VISIT, SUBSEQUENT: Primary | ICD-10-CM

## 2024-12-30 DIAGNOSIS — Z23 ENCOUNTER FOR IMMUNIZATION: ICD-10-CM

## 2024-12-30 PROCEDURE — G0008 ADMIN INFLUENZA VIRUS VAC: HCPCS | Performed by: STUDENT IN AN ORGANIZED HEALTH CARE EDUCATION/TRAINING PROGRAM

## 2024-12-30 PROCEDURE — 90662 IIV NO PRSV INCREASED AG IM: CPT | Performed by: STUDENT IN AN ORGANIZED HEALTH CARE EDUCATION/TRAINING PROGRAM

## 2024-12-30 PROCEDURE — G0439 PPPS, SUBSEQ VISIT: HCPCS | Performed by: STUDENT IN AN ORGANIZED HEALTH CARE EDUCATION/TRAINING PROGRAM

## 2024-12-30 NOTE — PROGRESS NOTES
Name: Kimberly Shine      : 1950      MRN: [id number]  Encounter Provider: Farhad Vargas DO  Encounter Date: 2024   Encounter department: [facility] INTERNAL MEDICINE    Assessment & Plan  Medicare annual wellness visit, subsequent  Patient presents to clinic today for Medicare annual wellness visit.  This was performed at today's encounter.  Reviewed Medicare screening questionnaire.  No concerns at this time.  Reviewed patient's past medical history, medications, allergies, immunizations.  Patient agreeable to influenza vaccine which was administered today.  Patient refused COVID-vaccine.  Patient had blood work done in October which was reviewed.  Next of the blood work already ordered and patient was reminded to complete this.  Patient due for colorectal cancer screening as last colonoscopy was in 2019.  Patient will discuss colorectal cancer screening with her oncologist and will schedule if indicated.       Encounter for immunization  Patient received flu vaccineencounter.  Orders:    influenza vaccine, high-dose, PF 0.5 mL (Fluzone High Dose)       Preventive health issues were discussed with patient, and age appropriate screening tests were ordered as noted in patient's After Visit Summary. Personalized health advice and appropriate referrals for health education or preventive services given if needed, as noted in patient's After Visit Summary.    History of Present Illness     Ms. Kimberly Shine is a 74-year-old female with a past medical history of bilateral lower extremity edema due to peripheral venous insufficiency, GERD, chemotherapy-induced neuropathies, history of malignant neoplasm of upper-outer quadrant of left breast, dyslipidemia.  Patient presents to clinic today accompanied by her  who is also a patient for Medicare annual wellness visit. Patient recently had BMP, lipid panel, and A1c performed in 2024.  A1c is increased to 6.3%.       Patient Care  Team:  Reddy Choudhary MD as PCP - General (Internal Medicine)    Review of Systems   Constitutional:  Negative for chills and fever.   HENT:  Negative for congestion, rhinorrhea and sore throat.    Respiratory:  Negative for cough, shortness of breath and wheezing.    Cardiovascular:  Negative for chest pain and leg swelling.   Gastrointestinal:  Negative for abdominal distention, abdominal pain, constipation, diarrhea, nausea and vomiting.   Genitourinary:  Negative for difficulty urinating and dysuria.   Musculoskeletal:  Negative for arthralgias and back pain.   Skin:  Negative for rash and wound.   Neurological:  Negative for dizziness, syncope, weakness, light-headedness and headaches.   Psychiatric/Behavioral:  Negative for agitation, behavioral problems and confusion.      Medical History Reviewed by provider this encounter:       Annual Wellness Visit Questionnaire   Kimberly is here for her Subsequent Wellness visit. Last Medicare Wellness visit information reviewed, patient interviewed and updates made to the record today.      Health Risk Assessment:   Patient rates overall health as very good. Patient feels that their physical health rating is same. Patient is satisfied with their life. Eyesight was rated as same. Hearing was rated as same. Patient feels that their emotional and mental health rating is same. Patients states they are never, rarely angry. Patient states they are never, rarely unusually tired/fatigued. Pain experienced in the last 7 days has been none. Patient states that she has experienced no weight loss or gain in last 6 months.     Depression Screening:   PHQ-2 Score: 0  PHQ-9 Score: 0      Fall Risk Screening:   In the past year, patient has experienced: no history of falling in past year      Urinary Incontinence Screening:   Patient has not leaked urine accidently in the last six months.     Home Safety:  Patient does not have trouble with stairs inside or outside of their home. Patient  has working smoke alarms and has working carbon monoxide detector. Home safety hazards include: none.     Nutrition:   Current diet is Regular and Low Carb.     Medications:   Patient is currently taking over-the-counter supplements. OTC medications include: see medication list. Patient is able to manage medications.     Activities of Daily Living (ADLs)/Instrumental Activities of Daily Living (IADLs):   Walk and transfer into and out of bed and chair?: Yes  Dress and groom yourself?: Yes    Bathe or shower yourself?: Yes    Feed yourself? Yes  Do your laundry/housekeeping?: Yes  Manage your money, pay your bills and track your expenses?: Yes  Make your own meals?: Yes    Do your own shopping?: Yes    Previous Hospitalizations:   Any hospitalizations or ED visits within the last 12 months?: No      Advance Care Planning:   Living will: Yes    Durable POA for healthcare: Yes    Advanced directive: Yes      PREVENTIVE SCREENINGS      Cardiovascular Screening:    General: Screening Not Indicated and History Lipid Disorder      Diabetes Screening:     General: Screening Current      Colorectal Cancer Screening:     General: Screening Current      Breast Cancer Screening:     General: History Breast Cancer      Cervical Cancer Screening:    General: Screening Not Indicated      Lung Cancer Screening:     General: Screening Not Indicated      Hepatitis C Screening:    General: Screening Current    Screening, Brief Intervention, and Referral to Treatment (SBIRT)    Screening  Typical number of drinks in a day: 0  Typical number of drinks in a week: 0  Interpretation: Low risk drinking behavior.    AUDIT-C Screenin) How often did you have a drink containing alcohol in the past year? never  2) How many drinks did you have on a typical day when you were drinking in the past year? 0  3) How often did you have 6 or more drinks on one occasion in the past year? never    AUDIT-C Score: 0  Interpretation: Score 0-2 (female):  "Negative screen for alcohol misuse    Single Item Drug Screening:  How often have you used an illegal drug (including marijuana) or a prescription medication for non-medical reasons in the past year? never    Single Item Drug Screen Score: 0  Interpretation: Negative screen for possible drug use disorder    Social Drivers of Health     Financial Resource Strain: Low Risk  (12/20/2023)    Overall Financial Resource Strain (CARDIA)     Difficulty of Paying Living Expenses: Not very hard   Food Insecurity: No Food Insecurity (12/30/2024)    Hunger Vital Sign     Worried About Running Out of Food in the Last Year: Never true     Ran Out of Food in the Last Year: Never true   Transportation Needs: No Transportation Needs (12/30/2024)    PRAPARE - Transportation     Lack of Transportation (Medical): No     Lack of Transportation (Non-Medical): No   Housing Stability: Unknown (12/30/2024)    Housing Stability Vital Sign     Unable to Pay for Housing in the Last Year: No     Homeless in the Last Year: No   Utilities: Not At Risk (12/30/2024)    [facility] Utilities     Threatened with loss of utilities: No     No results found.    Objective   Pulse 77   Resp 15   Ht 5' 2\" (1.575 m)   Wt 71.5 kg (157 lb 9.6 oz)   SpO2 97%   BMI 28.83 kg/m²     Physical Exam  Constitutional:       Appearance: Normal appearance.   HENT:      Right Ear: External ear normal.      Left Ear: External ear normal.   Cardiovascular:      Rate and Rhythm: Normal rate and regular rhythm.      Pulses: Normal pulses.      Heart sounds: Normal heart sounds. No murmur heard.  Pulmonary:      Effort: Pulmonary effort is normal. No respiratory distress.      Breath sounds: Normal breath sounds. No wheezing, rhonchi or rales.   Abdominal:      General: Abdomen is flat. Bowel sounds are normal. There is no distension.      Palpations: Abdomen is soft.      Tenderness: There is no abdominal tenderness.   Musculoskeletal:      Right lower leg: No edema.      Left " lower leg: No edema.   Skin:     General: Skin is warm and dry.   Neurological:      Mental Status: She is alert and oriented to person, place, and time.   Psychiatric:         Mood and Affect: Mood normal.         Behavior: Behavior normal.         Thought Content: Thought content normal.

## 2024-12-30 NOTE — PATIENT INSTRUCTIONS
Medicare Preventive Visit Patient Instructions  Thank you for completing your Welcome to Medicare Visit or Medicare Annual Wellness Visit today. Your next wellness visit will be due in one year (12/31/2025).  The screening/preventive services that you may require over the next 5-10 years are detailed below. Some tests may not apply to you based off risk factors and/or age. Screening tests ordered at today's visit but not completed yet may show as past due. Also, please note that scanned in results may not display below.  Preventive Screenings:  Service Recommendations Previous Testing/Comments   Colorectal Cancer Screening  * Colonoscopy    * Fecal Occult Blood Test (FOBT)/Fecal Immunochemical Test (FIT)  * Fecal DNA/Cologuard Test  * Flexible Sigmoidoscopy Age: 45-75 years old   Colonoscopy: every 10 years (may be performed more frequently if at higher risk)  OR  FOBT/FIT: every 1 year  OR  Cologuard: every 3 years  OR  Sigmoidoscopy: every 5 years  Screening may be recommended earlier than age 45 if at higher risk for colorectal cancer. Also, an individualized decision between you and your healthcare provider will decide whether screening between the ages of 76-85 would be appropriate. Colonoscopy: 12/20/2019  FOBT/FIT: Not on file  Cologuard: Not on file  Sigmoidoscopy: Not on file    Screening Current     Breast Cancer Screening Age: 40+ years old  Frequency: every 1-2 years  Not required if history of left and right mastectomy Mammogram: 11/12/2024    History Breast Cancer   Cervical Cancer Screening Between the ages of 21-29, pap smear recommended once every 3 years.   Between the ages of 30-65, can perform pap smear with HPV co-testing every 5 years.   Recommendations may differ for women with a history of total hysterectomy, cervical cancer, or abnormal pap smears in past. Pap Smear: Not on file    Screening Not Indicated   Hepatitis C Screening Once for adults born between 1945 and 1965  More frequently in  patients at high risk for Hepatitis C Hep C Antibody: 08/18/2017    Screening Current   Diabetes Screening 1-2 times per year if you're at risk for diabetes or have pre-diabetes Fasting glucose: 98 mg/dL (1/28/2022)  A1C: 6.3 % (10/17/2024)  Screening Current   Cholesterol Screening Once every 5 years if you don't have a lipid disorder. May order more often based on risk factors. Lipid panel: 10/17/2024    Screening Not Indicated  History Lipid Disorder     Other Preventive Screenings Covered by Medicare:  Abdominal Aortic Aneurysm (AAA) Screening: covered once if your at risk. You're considered to be at risk if you have a family history of AAA.  Lung Cancer Screening: covers low dose CT scan once per year if you meet all of the following conditions: (1) Age 55-77; (2) No signs or symptoms of lung cancer; (3) Current smoker or have quit smoking within the last 15 years; (4) You have a tobacco smoking history of at least 20 pack years (packs per day multiplied by number of years you smoked); (5) You get a written order from a healthcare provider.  Glaucoma Screening: covered annually if you're considered high risk: (1) You have diabetes OR (2) Family history of glaucoma OR (3)  aged 50 and older OR (4)  American aged 65 and older  Osteoporosis Screening: covered every 2 years if you meet one of the following conditions: (1) You're estrogen deficient and at risk for osteoporosis based off medical history and other findings; (2) Have a vertebral abnormality; (3) On glucocorticoid therapy for more than 3 months; (4) Have primary hyperparathyroidism; (5) On osteoporosis medications and need to assess response to drug therapy.   Last bone density test (DXA Scan): 02/24/2023.  HIV Screening: covered annually if you're between the age of 15-65. Also covered annually if you are younger than 15 and older than 65 with risk factors for HIV infection. For pregnant patients, it is covered up to 3 times per  pregnancy.    Immunizations:  Immunization Recommendations   Influenza Vaccine Annual influenza vaccination during flu season is recommended for all persons aged >= 6 months who do not have contraindications   Pneumococcal Vaccine   * Pneumococcal conjugate vaccine = PCV13 (Prevnar 13), PCV15 (Vaxneuvance), PCV20 (Prevnar 20)  * Pneumococcal polysaccharide vaccine = PPSV23 (Pneumovax) Adults 19-63 yo with certain risk factors or if 65+ yo  If never received any pneumonia vaccine: recommend Prevnar 20 (PCV20)  Give PCV20 if previously received 1 dose of PCV13 or PPSV23   Hepatitis B Vaccine 3 dose series if at intermediate or high risk (ex: diabetes, end stage renal disease, liver disease)   Respiratory syncytial virus (RSV) Vaccine - COVERED BY MEDICARE PART D  * RSVPreF3 (Arexvy) CDC recommends that adults 60 years of age and older may receive a single dose of RSV vaccine using shared clinical decision-making (SCDM)   Tetanus (Td) Vaccine - COST NOT COVERED BY MEDICARE PART B Following completion of primary series, a booster dose should be given every 10 years to maintain immunity against tetanus. Td may also be given as tetanus wound prophylaxis.   Tdap Vaccine - COST NOT COVERED BY MEDICARE PART B Recommended at least once for all adults. For pregnant patients, recommended with each pregnancy.   Shingles Vaccine (Shingrix) - COST NOT COVERED BY MEDICARE PART B  2 shot series recommended in those 19 years and older who have or will have weakened immune systems or those 50 years and older     Health Maintenance Due:      Topic Date Due   • Colorectal Cancer Screening  12/20/2024   • Breast Cancer Screening: Mammogram  11/12/2025   • Hepatitis C Screening  Completed     Immunizations Due:      Topic Date Due   • Pneumococcal Vaccine: 65+ Years (2 of 2 - PPSV23 or PCV20) 10/29/2017   • COVID-19 Vaccine (5 - 2024-25 season) 09/01/2024     Advance Directives   What are advance directives?  Advance directives are legal  documents that state your wishes and plans for medical care. These plans are made ahead of time in case you lose your ability to make decisions for yourself. Advance directives can apply to any medical decision, such as the treatments you want, and if you want to donate organs.   What are the types of advance directives?  There are many types of advance directives, and each state has rules about how to use them. You may choose a combination of any of the following:  Living will:  This is a written record of the treatment you want. You can also choose which treatments you do not want, which to limit, and which to stop at a certain time. This includes surgery, medicine, IV fluid, and tube feedings.   Durable power of  for healthcare (DPAHC):  This is a written record that states who you want to make healthcare choices for you when you are unable to make them for yourself. This person, called a proxy, is usually a family member or a friend. You may choose more than 1 proxy.  Do not resuscitate (DNR) order:  A DNR order is used in case your heart stops beating or you stop breathing. It is a request not to have certain forms of treatment, such as CPR. A DNR order may be included in other types of advance directives.  Medical directive:  This covers the care that you want if you are in a coma, near death, or unable to make decisions for yourself. You can list the treatments you want for each condition. Treatment may include pain medicine, surgery, blood transfusions, dialysis, IV or tube feedings, and a ventilator (breathing machine).  Values history:  This document has questions about your views, beliefs, and how you feel and think about life. This information can help others choose the care that you would choose.  Why are advance directives important?  An advance directive helps you control your care. Although spoken wishes may be used, it is better to have your wishes written down. Spoken wishes can be  misunderstood, or not followed. Treatments may be given even if you do not want them. An advance directive may make it easier for your family to make difficult choices about your care.   Weight Management   Why it is important to manage your weight:  Being overweight increases your risk of health conditions such as heart disease, high blood pressure, type 2 diabetes, and certain types of cancer. It can also increase your risk for osteoarthritis, sleep apnea, and other respiratory problems. Aim for a slow, steady weight loss. Even a small amount of weight loss can lower your risk of health problems.  How to lose weight safely:  A safe and healthy way to lose weight is to eat fewer calories and get regular exercise. You can lose up about 1 pound a week by decreasing the number of calories you eat by 500 calories each day.   Healthy meal plan for weight management:  A healthy meal plan includes a variety of foods, contains fewer calories, and helps you stay healthy. A healthy meal plan includes the following:  Eat whole-grain foods more often.  A healthy meal plan should contain fiber. Fiber is the part of grains, fruits, and vegetables that is not broken down by your body. Whole-grain foods are healthy and provide extra fiber in your diet. Some examples of whole-grain foods are whole-wheat breads and pastas, oatmeal, brown rice, and bulgur.  Eat a variety of vegetables every day.  Include dark, leafy greens such as spinach, kale, jere greens, and mustard greens. Eat yellow and orange vegetables such as carrots, sweet potatoes, and winter squash.   Eat a variety of fruits every day.  Choose fresh or canned fruit (canned in its own juice or light syrup) instead of juice. Fruit juice has very little or no fiber.  Eat low-fat dairy foods.  Drink fat-free (skim) milk or 1% milk. Eat fat-free yogurt and low-fat cottage cheese. Try low-fat cheeses such as mozzarella and other reduced-fat cheeses.  Choose meat and other  protein foods that are low in fat.  Choose beans or other legumes such as split peas or lentils. Choose fish, skinless poultry (chicken or turkey), or lean cuts of red meat (beef or pork). Before you cook meat or poultry, cut off any visible fat.   Use less fat and oil.  Try baking foods instead of frying them. Add less fat, such as margarine, sour cream, regular salad dressing and mayonnaise to foods. Eat fewer high-fat foods. Some examples of high-fat foods include french fries, doughnuts, ice cream, and cakes.  Eat fewer sweets.  Limit foods and drinks that are high in sugar. This includes candy, cookies, regular soda, and sweetened drinks.  Exercise:  Exercise at least 30 minutes per day on most days of the week. Some examples of exercise include walking, biking, dancing, and swimming. You can also fit in more physical activity by taking the stairs instead of the elevator or parking farther away from stores. Ask your healthcare provider about the best exercise plan for you.      © Copyright [employer] 2018 Information is for End User's use only and may not be sold, redistributed or otherwise used for commercial purposes. All illustrations and images included in CareNotes® are the copyrighted property of [employer].D.A[employer]M., Inc. or [employer]

## 2025-02-16 ENCOUNTER — OFFICE VISIT (OUTPATIENT)
Dept: URGENT CARE | Facility: MEDICAL CENTER | Age: 75
End: 2025-02-16
Payer: COMMERCIAL

## 2025-02-16 VITALS
OXYGEN SATURATION: 100 % | SYSTOLIC BLOOD PRESSURE: 161 MMHG | DIASTOLIC BLOOD PRESSURE: 80 MMHG | RESPIRATION RATE: 18 BRPM | TEMPERATURE: 98 F | HEART RATE: 78 BPM

## 2025-02-16 DIAGNOSIS — J02.9 SORE THROAT: Primary | ICD-10-CM

## 2025-02-16 LAB — S PYO AG THROAT QL: NEGATIVE

## 2025-02-16 PROCEDURE — 99213 OFFICE O/P EST LOW 20 MIN: CPT

## 2025-02-16 PROCEDURE — 87070 CULTURE OTHR SPECIMN AEROBIC: CPT

## 2025-02-16 PROCEDURE — 87636 SARSCOV2 & INF A&B AMP PRB: CPT

## 2025-02-16 PROCEDURE — 87880 STREP A ASSAY W/OPTIC: CPT

## 2025-02-16 RX ORDER — VITAMIN B COMPLEX
TABLET ORAL
COMMUNITY

## 2025-02-16 RX ORDER — OSELTAMIVIR PHOSPHATE 75 MG/1
75 CAPSULE ORAL EVERY 12 HOURS SCHEDULED
Qty: 10 CAPSULE | Refills: 0 | Status: SHIPPED | OUTPATIENT
Start: 2025-02-16 | End: 2025-02-21

## 2025-02-16 NOTE — PROGRESS NOTES
[facility]        NAME: Kimberly Shine is a 74 y.o. female  : 1950    MRN: [id number]  DATE: 2025  TIME: 5:23 PM    Assessment and Plan   Sore throat [J02.9]  1. Sore throat  POCT rapid ANTIGEN strepA    oseltamivir (TAMIFLU) 75 mg capsule    Covid/Flu- Office Collect Normal    Throat culture        Rapid strep test: Negative.  Throat culture sent out.   COVID/flu testing sent out.    Presentation consistent with viral illness.  Advised symptomatic treatment.  Discussed with patient if flu results return positive, she may start Tamiflu prescription.  If negative, do not take Tamiflu.    Patient Instructions     Rapid strep test negative.   Throat culture sent out. If the culture comes back positive, you may need treatment with antibiotics.  COVID/Flu testing sent out.  If Flu testing positive, start Tamiflu prescription.  If negative, do not start prescription.  Your symptoms are likely due to a viral illness. The mainstay of treatment is for symptom relief, see below for recommended medications.  Fever/Pain: Over the counter Tylenol and/or Motrin as directed on packaging.  Cough: Mucinex can help to thin mucus, making it easier to cough up. Delsym or Robitussin can help to suppress the cough. Utilizing a vaporizer/humidifier may help, as well as increasing fluids.  Sore Throat: Salt water gargles with 1 teaspoon of salt dissolved in 6-8 oz of water, honey, drinking plenty of liquids, eating soft foods. If severe, can utilize OTC chloraseptic spray.  Nasal Congestion: Over the counter allergy medication like Claritin, Allegra or Zyrtec can help with nasal congestion and post nasal drip.  Over the counter saline or steroid nasal sprays like Flonase can help with nasal congestion and post nasal drip as well. Over the counter decongestants such as Sudafed may also help.  Upset Stomach: Drink plenty of fluids. May utilize Gatorade, Pedialyte, or other electrolyte solutions to supplement.  Eat bland foods (ex: BRAT - bananas, rice, applesauce, toast) and slowly advance to other foods as tolerated.  Please note, if you have heart issues or high blood pressure, the cough/cold medication of choice is Coricidin. Talk to your doctor before trying any new medications.    Follow up with PCP in 3-5 days.  Proceed to  ER if symptoms worsen.    If tests are performed, our office will contact you with results only if changes need to made to the care plan discussed with you at the visit. You can review your full [facility]    Chief Complaint     Chief Complaint   Patient presents with    Sore Throat     Patient c/o sore throat with chills that started last night.          History of Present Illness       Patient presents for evaluation of sore throat, chills.  Notes symptoms started yesterday.  Denies known sick contacts.    Sore Throat   This is a new problem. The current episode started yesterday. The problem has been unchanged. Neither side of throat is experiencing more pain than the other. There has been no fever. The pain is at a severity of 7/10. Pertinent negatives include no abdominal pain, congestion, coughing, diarrhea, ear discharge, ear pain, shortness of breath, stridor, trouble swallowing or vomiting. Treatments tried: Tylenol Cold, Coricidin, Rowe's lozenges. The treatment provided mild relief.       Review of Systems   Review of Systems   Constitutional:  Positive for chills. Negative for appetite change and fever.   HENT:  Positive for sore throat. Negative for congestion, ear discharge, ear pain, postnasal drip, rhinorrhea, sinus pressure, sinus pain and trouble swallowing.    Eyes:  Negative for pain, discharge, redness and itching.   Respiratory:  Negative for cough, chest tightness, shortness of breath, wheezing and stridor.    Gastrointestinal:  Positive for nausea. Negative for abdominal pain, diarrhea and vomiting.   Musculoskeletal:  Negative for myalgias.   Skin:   Negative for rash.         Current Medications       Current Outpatient Medications:     oseltamivir (TAMIFLU) 75 mg capsule, Take 1 capsule (75 mg total) by mouth every 12 (twelve) hours for 5 days, Disp: 10 capsule, Rfl: 0    aspirin 81 MG tablet, Take 81 mg by mouth daily  , Disp: , Rfl:     B Complex Vitamins (Vitamin B-Complex) TABS, Take by mouth, Disp: , Rfl:     Biotin 1 MG CAPS, Take by mouth, Disp: , Rfl:     Cholecalciferol 50 MCG (2000 UT) CAPS, Take 1 capsule by mouth daily, Disp: , Rfl:     Cyanocobalamin ER 1000 MCG TBCR, Take 1 tablet by mouth daily, Disp: , Rfl:     MAGNESIUM PO, Take by mouth daily, Disp: , Rfl:     Menaquinone-7 (VITAMIN K2 PO), Take by mouth, Disp: , Rfl:     metFORMIN (GLUCOPHAGE) 500 mg tablet, Take 1 tablet by mouth daily with breakfast, Disp: , Rfl:     Multiple Vitamin (MULTI-VITAMINS PO), Take 1 tablet by mouth daily, Disp: , Rfl:     Multiple Vitamins-Minerals (Multivitamin Adult, Minerals,) TABS, Take by mouth, Disp: , Rfl:     nystatin (MYCOSTATIN) cream, Apply topically 2 (two) times a day for 7 days, Disp: 30 g, Rfl: 0    Omega-3 Fatty Acids (FISH OIL MAXIMUM STRENGTH) 1200 MG CPDR, Take 2 capsules by mouth daily, Disp: , Rfl:     omeprazole (PriLOSEC) 20 mg delayed release capsule, TAKE 1 CAPSULE BY MOUTH DAILY TAKE 20 MINUTES PRIOR TO BREAKFAST DAILY FOR 30 DAYS THEN DISCONTINUE, Disp: 90 capsule, Rfl: 1    Red Yeast Rice Extract 600 MG TABS, Take 1 tablet by mouth daily, Disp: , Rfl:     valACYclovir (VALTREX) 1,000 mg tablet, Take 1 tablet (1,000 mg total) by mouth 3 (three) times a day for 7 days, Disp: 21 tablet, Rfl: 0    vitamin E 100 UNIT capsule, Take 100 Units by mouth daily  , Disp: , Rfl:     Current Allergies     Allergies as of 02/16/2025 - Reviewed 12/30/2024   Allergen Reaction Noted    Pear - food allergy Other (See Comments) 02/16/2025    Pectin Other (See Comments) 02/16/2025    Prunus persica Other (See Comments) 02/16/2025    Rosuvastatin Myalgia  10/18/2024    Benzalkonium chloride Rash 09/14/2018    Metformin Headache 11/07/2024    Neomycin-bacitracin zn-polymyx Rash 07/18/2014    Other Hives 05/25/2018    Pollen extract Allergic Rhinitis 05/25/2018            The following portions of the patient's history were reviewed and updated as appropriate: allergies, current medications, past family history, past medical history, past social history, past surgical history and problem list.     Past Medical History:   Diagnosis Date    Cancer (HCC)     left breast, lumpectomy,with chemo and radiation    Hyperlipidemia        Past Surgical History:   Procedure Laterality Date    BREAST SURGERY Left     lumpectomy    CATARACT EXTRACTION      last assessed 3/21/16    COLONOSCOPY      NEUROPLASTY / TRANSPOSITION MEDIAN NERVE AT CARPAL TUNNEL      last assessed 3/21/16     TONSILLECTOMY      TOOTH EXTRACTION      last assessed 3/21/16       Family History   Problem Relation Age of Onset    Diabetes Paternal Aunt          Medications have been verified.        Objective   /80   Pulse 78   Temp 98 °F (36.7 °C)   Resp 18   SpO2 100%        Physical Exam     Physical Exam  Vitals and nursing note reviewed.   Constitutional:       General: She is not in acute distress.     Appearance: Normal appearance. She is not ill-appearing.   HENT:      Right Ear: Tympanic membrane, ear canal and external ear normal.      Left Ear: Tympanic membrane, ear canal and external ear normal.      Nose: Nose normal. No congestion or rhinorrhea.      Mouth/Throat:      Mouth: Mucous membranes are moist.      Pharynx: Posterior oropharyngeal erythema (minimal) present. No oropharyngeal exudate.   Eyes:      General:         Right eye: No discharge.         Left eye: No discharge.      Extraocular Movements: Extraocular movements intact.      Conjunctiva/sclera: Conjunctivae normal.      Pupils: Pupils are equal, round, and reactive to light.   Cardiovascular:      Rate and Rhythm: Normal  rate and regular rhythm.      Pulses: Normal pulses.      Heart sounds: Normal heart sounds.   Pulmonary:      Effort: Pulmonary effort is normal. No respiratory distress.      Breath sounds: Normal breath sounds. No wheezing, rhonchi or rales.   Abdominal:      General: Abdomen is flat. Bowel sounds are normal. There is no distension.      Palpations: Abdomen is soft.      Tenderness: There is no abdominal tenderness. There is no guarding.   Musculoskeletal:      Cervical back: Neck supple.   Lymphadenopathy:      Cervical: No cervical adenopathy.   Skin:     General: Skin is warm and dry.   Neurological:      Mental Status: She is alert.

## 2025-02-16 NOTE — PATIENT INSTRUCTIONS
Rapid strep test negative.   Throat culture sent out. If the culture comes back positive, you may need treatment with antibiotics.  COVID/Flu testing sent out.  If Flu testing positive, start Tamiflu prescription.  If negative, do not start prescription.  Your symptoms are likely due to a viral illness. The mainstay of treatment is for symptom relief, see below for recommended medications.  Fever/Pain: Over the counter Tylenol and/or Motrin as directed on packaging.  Cough: Mucinex can help to thin mucus, making it easier to cough up. Delsym or Robitussin can help to suppress the cough. Utilizing a vaporizer/humidifier may help, as well as increasing fluids.  Sore Throat: Salt water gargles with 1 teaspoon of salt dissolved in 6-8 oz of water, honey, drinking plenty of liquids, eating soft foods. If severe, can utilize OTC chloraseptic spray.  Nasal Congestion: Over the counter allergy medication like Claritin, Allegra or Zyrtec can help with nasal congestion and post nasal drip.  Over the counter saline or steroid nasal sprays like Flonase can help with nasal congestion and post nasal drip as well. Over the counter decongestants such as Sudafed may also help.  Upset Stomach: Drink plenty of fluids. May utilize Gatorade, Pedialyte, or other electrolyte solutions to supplement. Eat bland foods (ex: BRAT - bananas, rice, applesauce, toast) and slowly advance to other foods as tolerated.  Please note, if you have heart issues or high blood pressure, the cough/cold medication of choice is Coricidin. Talk to your doctor before trying any new medications.    Follow up with PCP in 3-5 days.  Proceed to  ER if symptoms worsen.    If tests are performed, our office will contact you with results only if changes need to made to the care plan discussed with you at the visit. You can review your full results on [facility].    Patient Education     Sore throat in adults   The Basics   Written by the doctors and editors at  UpToDate   What causes sore throat? -- Sore throat is usually caused by an infection. Two types of germs can cause it: viruses and bacteria.  People who have a sore throat caused by a virus do not usually need to see a doctor or nurse. But if you think that you might have been exposed to COVID-19, or if COVID-19 is common where you live, ask your doctor or nurse if you should be tested.  People who have a sore throat caused by bacteria might need to see a doctor or nurse. They might have a type of infection called strep throat. Only about 1 in 10 adults who seek medical care for sore throat have strep throat.  How can I tell if my sore throat is caused by a virus or strep throat? -- It is hard to tell the difference. But there are some clues to look for.  People who have a sore throat caused by a virus usually have other symptoms, such as:   Stuffy or runny nose   Itchy or red eyes   Cough  People who have COVID-19 can have a sore throat as their only symptom. Or they can have other symptoms such as fever, cough, trouble breathing, and problems with their sense of smell or taste. In most cases, the only way to know for sure if you have COVID-19 is to get tested.  People who have strep throat do not usually have a cough, runny nose, or itchy or red eyes. They might have:   Severe throat pain   Fever (temperature higher than 100.4°F or 38°C)   Swollen glands in the neck  If you think that you have strep throat, the doctor or nurse can easily check. They can take a sample from the back of your throat and test it for the bacteria that cause strep throat.  Do I need antibiotics? -- If you have an infection caused by a virus, you do not need antibiotics. But if you have strep throat, you should get antibiotics. Most people with strep throat get better without antibiotics, but doctors and nurses often prescribe them. This is because antibiotics often can prevent other problems that might be caused by strep throat. Plus,  "antibiotics can reduce the symptoms of strep throat and prevent you from spreading it to other people.  What can I do to feel better? -- To relieve the pain of sore throat, you can:   Take over-the-counter pain medicine - Acetaminophen (sample brand name: Tylenol) or ibuprofen (sample brand names: Advil, Motrin) can help with throat pain.   Use sore throat lozenges or sprays - Using medicated sore throat lozenges or throat sprays can temporarily reduce throat pain.   Suck on hard candies, ice chips, or ice pops.   Gargle with salt water - Some people find that this helps with throat pain.   Use a cool mist humidifier - This adds moisture to the air to keep the throat from getting too dry and might help with pain.   Avoid smoking or being around people who are smoking - Smoke can make throat pain worse.  When can I go back to work or school? -- If you have strep throat, wait 1 day after starting antibiotics. By then, you will be a lot less likely to spread the infection to others.  If you have COVID-19, stay home from work or school and \"self-isolate\" until your doctor or nurse tells you it's safe to stop. Self-isolation means staying apart from other people, even the people you live with. When you can stop self-isolation depends on how long it has been since you had symptoms, and in some cases, whether you have had a negative test (showing that the virus is no longer in your body).  If you have a sore throat that is not due to strep throat or COVID-19, you can go back to your usual activities as soon as you feel well. But it's still important to wash your hands often and cover your mouth if you cough.  When should I call the doctor? -- Call your doctor or nurse if:   You have a fever of at least 101°F (38.4°C).   Your throat pain is severe within the first 2 days, or does not start to improve within 5 to 7 days.   You are having trouble getting enough to eat or drink.   You got antibiotics but still have symptoms " after finishing them.  Call for an ambulance (in the [place] and [place], call 9-1-1) or go to the emergency department if you:   Have trouble breathing   Are drooling because you cannot swallow your saliva   Have swelling of the neck or tongue   Cannot move your neck, or have trouble opening your mouth  What can I do to prevent getting a sore throat again? -- Wash your hands often with soap and water (figure 1). It is one of the best ways to prevent the spread of infection.  All topics are updated as new evidence becomes available and our peer review process is complete.  This topic retrieved from [employer] on: Mar 13, 2024.  Topic 78846 Version 23.0  Release: 32.2.4 - C32.71  © 2024 UpToDate, Inc. and/or its affiliates. All rights reserved.  figure 1: How to wash your hands     Wet your hands with clean water, and apply a small amount of soap. Lather and rub hands together for at least 20 seconds. Clean your wrists, palms, backs of your hands, between your fingers, tips of your fingers, thumbs, and under and around your nails. Rinse well, and dry your hands using a clean towel.  Graphic 278158 Version 7.0  Consumer Information Use and Disclaimer   Disclaimer: This generalized information is a limited summary of diagnosis, treatment, and/or medication information. It is not meant to be comprehensive and should be used as a tool to help the user understand and/or assess potential diagnostic and treatment options. It does NOT include all information about conditions, treatments, medications, side effects, or risks that may apply to a specific patient. It is not intended to be medical advice or a substitute for the medical advice, diagnosis, or treatment of a health care provider based on the health care provider's examination and assessment of a patient's specific and unique circumstances. Patients must speak with a health care provider for complete information about their health, medical questions, and treatment options,  including any risks or benefits regarding use of medications. This information does not endorse any treatments or medications as safe, effective, or approved for treating a specific patient. UpToDate, Inc. and its affiliates disclaim any warranty or liability relating to this information or the use thereof.The use of this information is governed by the Terms of Use, available at https://www.[employer].com/en/know/clinical-effectiveness-terms. 2024© UpToDate, Inc. and its affiliates and/or licensors. All rights reserved.  Copyright   © 2024 UpToDate, Inc. and/or its affiliates. All rights reserved.

## 2025-02-17 ENCOUNTER — RESULTS FOLLOW-UP (OUTPATIENT)
Dept: URGENT CARE | Facility: MEDICAL CENTER | Age: 75
End: 2025-02-17

## 2025-02-17 LAB
FLUAV RNA RESP QL NAA+PROBE: NEGATIVE
FLUBV RNA RESP QL NAA+PROBE: NEGATIVE
SARS-COV-2 RNA RESP QL NAA+PROBE: NEGATIVE

## 2025-02-18 LAB — BACTERIA THROAT CULT: NORMAL

## 2025-02-21 ENCOUNTER — TELEPHONE (OUTPATIENT)
Age: 75
End: 2025-02-21

## 2025-02-21 NOTE — TELEPHONE ENCOUNTER
MD Jorge Louie MA  Please inform Kimberly that the treatment for laryngitis, which is viral, is supportive.  I think what she is doing is appropriate.      Called patient back, understands message from Dr. Choudhary.    Thank you

## 2025-04-14 NOTE — TELEPHONE ENCOUNTER
Patient called she was seen urgent care 2/16 for sore throat.  She woke up this morning and lost her voice.  She said she feels better but now she lost her voice.  She has been drinking tea with honey and taking Ibuprofen and in bed.  She didn't know if she should be doing something else to help her with losing her voice.    Thank you    14-Apr-2025 03:53

## 2025-04-24 ENCOUNTER — TELEPHONE (OUTPATIENT)
Age: 75
End: 2025-04-24

## 2025-04-24 ENCOUNTER — NURSE TRIAGE (OUTPATIENT)
Age: 75
End: 2025-04-24

## 2025-04-24 NOTE — TELEPHONE ENCOUNTER
Pt called and wanted to be seen by you tomorrow (Friday) for a Left ankle swelling. Made Pt an appt with Dr Vargas but Pt wants to see you and asked that I message you. Pt leave for [place] so wants to be seen tomorrow

## 2025-04-24 NOTE — TELEPHONE ENCOUNTER
"FOLLOW UP: patient has appointment tomorrow at 11:40    REASON FOR CONVERSATION: Joint Swelling    SYMPTOMS: left ankle swelling and top of foot. Goes away with ice and compressions socks but when up on feet and moving around the swelling comes back. Denies pain.     OTHER: patient is leaving for [place] on Tuesday 4/29    DISPOSITION: See Today or Tomorrow in Office    Reason for Disposition   Patient wants to be seen    Answer Assessment - Initial Assessment Questions  1. LOCATION: \"Which ankle is swollen?\" \"Where is the swelling?\"      Back of left ankle and some what on top of foot swelling  2. ONSET: \"When did the swelling start?\"      Monday-Tuesday  goes away with compression socks   3. SWELLING: \"How bad is the swelling?\" Or, \"How large is it?\" (e.g., mild, moderate, severe; size of localized swelling)       Mild   4. PAIN: \"Is there any pain?\" If Yes, ask: \"How bad is it?\" (Scale 0-10; or none, mild, moderate, severe)      No pain   5. CAUSE: \"What do you think caused the ankle swelling?\"      Unsure, never had this before   6. OTHER SYMPTOMS: \"Do you have any other symptoms?\" (e.g., fever, chest pain, difficulty breathing, calf pain)      Denies    Protocols used: Ankle Swelling-Adult-[place]    "

## 2025-04-25 ENCOUNTER — OFFICE VISIT (OUTPATIENT)
Age: 75
End: 2025-04-25
Payer: COMMERCIAL

## 2025-04-25 VITALS
WEIGHT: 157.4 LBS | SYSTOLIC BLOOD PRESSURE: 130 MMHG | HEART RATE: 78 BPM | BODY MASS INDEX: 28.97 KG/M2 | HEIGHT: 62 IN | DIASTOLIC BLOOD PRESSURE: 82 MMHG | TEMPERATURE: 97.8 F | OXYGEN SATURATION: 99 %

## 2025-04-25 DIAGNOSIS — I87.2 EDEMA OF BOTH LOWER EXTREMITIES DUE TO PERIPHERAL VENOUS INSUFFICIENCY: Primary | ICD-10-CM

## 2025-04-25 PROBLEM — M70.72 BURSITIS OF LEFT HIP: Status: RESOLVED | Noted: 2023-12-20 | Resolved: 2025-04-25

## 2025-04-25 PROCEDURE — G2211 COMPLEX E/M VISIT ADD ON: HCPCS | Performed by: INTERNAL MEDICINE

## 2025-04-25 PROCEDURE — 99213 OFFICE O/P EST LOW 20 MIN: CPT | Performed by: INTERNAL MEDICINE

## 2025-04-25 NOTE — PROGRESS NOTES
INTERNAL MEDICINE OFFICE VISIT       NAME: Kimberly Shine  AGE: 74 y.o. SEX: female       : 1950        MRN: [id number]    DATE: 2025  TIME: 2:02 PM    Assessment and Plan   1. Edema of both lower extremities due to peripheral venous insufficiency (Primary)  Diagnosis: Flare of increasing chronic edema, left leg greater than right leg, with underlying venous insufficiency.  This flare is most likely due to a combination of being on her feet more lately, warmer weather, increased sodium intake, and possibly some increased fluid intake.    There is no evidence of acute venous disease.    Plan: Continue to wear knee-high support stockings worn in the evening as tolerated, leg elevation when off feet, reduce sodium intake and mindfully reduce fluid intake.          Chief Complaint     Chief Complaint   Patient presents with    Joint Swelling     Left ankle swelling       History of Present Illness   Kimberly Shine is a 74 y.o.-year-old female who contacted the office yesterday regarding increased edema of the left lower leg, especially in the ankle region.  This began without any recent injury or immobilization.  Barely has underlying venous insufficiency of both legs with dependent edema, left greater than right and has noticed an increase in her chronic left leg edema since the corticosteroid injection of left hip last year.    There has been no pain in the leg, redness or tenderness noted.    Kimberly is not taking any new medications.  We discussed factors, including those mentioned above that may be contributing to her increased edema.    Plan is to continue conservative management.    Review of Systems   Review of Systems   Cardiovascular:  Positive for leg swelling.       Active Problem List     Patient Active Problem List   Diagnosis    Lumbosacral spondylosis without myelopathy    Chemotherapy-induced neuropathy (HCC)    Malignant neoplasm of upper-outer quadrant of left breast in female, estrogen  receptor negative (HCC)    Pure hypercholesterolemia    Carpal tunnel syndrome    Edema of both lower extremities due to peripheral venous insufficiency    Situational stress    Prediabetes    Gastroesophageal reflux disease with esophagitis without hemorrhage       The following portions of the patient's history were reviewed and updated as appropriate: allergies, current medications, past family history, past medical history, past social history, past surgical history, and problem list.    Objective     Vitals:    04/25/25 1343   BP: 130/82   Pulse: 78   Temp: 97.8 °F (36.6 °C)   SpO2: 99%     Wt Readings from Last 3 Encounters:   04/25/25 71.4 kg (157 lb 6.4 oz)   04/02/25 71.2 kg (157 lb)   12/30/24 71.5 kg (157 lb 9.6 oz)       Physical Exam    Vital signs stable, alert and oriented in no distress.  Pulse regular.  Lungs: Clear to auscultation.  Cardiac: Regular rate rhythm, normal S1 and S2, no murmur, no S4 or S3.  Exam of lower extremities: There are significant varicosities of both lower legs, more prominent on the left side than the right side, with bilateral mild pitting edema, greater on the left than the right, with excellent skin integrity, intact arterial circulation, no phlebitic findings or calf tenderness.              ALLERGIES:  Allergies   Allergen Reactions    Pear - Food Allergy Other (See Comments)    Pectin Other (See Comments)    Prunus Persica Other (See Comments)    Rosuvastatin Myalgia    Benzalkonium Chloride Rash    Metformin Headache    Neomycin-Bacitracin Zn-Polymyx Rash    Other Hives     Apples, pears, peaches     Pollen Extract Allergic Rhinitis       Current Medications     Current Outpatient Medications   Medication Sig Dispense Refill    aspirin 81 MG tablet Take 81 mg by mouth daily        B Complex Vitamins (Vitamin B-Complex) TABS Take by mouth      Biotin 1 MG CAPS Take by mouth      Cholecalciferol 50 MCG (2000 UT) CAPS Take 1 capsule by mouth daily      Cyanocobalamin ER  1000 MCG TBCR Take 1 tablet by mouth daily      MAGNESIUM PO Take by mouth daily      metFORMIN (GLUCOPHAGE) 500 mg tablet Take 1 tablet by mouth daily with breakfast      Multiple Vitamin (MULTI-VITAMINS PO) Take 1 tablet by mouth daily      Omega-3 Fatty Acids (FISH OIL MAXIMUM STRENGTH) 1200 MG CPDR Take 2 capsules by mouth daily      omeprazole (PriLOSEC) 20 mg delayed release capsule TAKE 1 CAPSULE BY MOUTH DAILY TAKE 20 MINUTES PRIOR TO BREAKFAST DAILY FOR 30 DAYS THEN DISCONTINUE 90 capsule 1    nystatin (MYCOSTATIN) cream Apply topically 2 (two) times a day for 7 days 30 g 0    valACYclovir (VALTREX) 1,000 mg tablet Take 1 tablet (1,000 mg total) by mouth 3 (three) times a day for 7 days 21 tablet 0     No current facility-administered medications for this visit.         Health Maintenance        Reddy Choudhary MD

## 2025-05-23 ENCOUNTER — TELEPHONE (OUTPATIENT)
Age: 75
End: 2025-05-23

## 2025-05-23 DIAGNOSIS — Z91.09 ENVIRONMENTAL ALLERGIES: Primary | ICD-10-CM

## 2025-05-23 RX ORDER — PREDNISONE 20 MG/1
20 TABLET ORAL DAILY
Qty: 5 TABLET | Refills: 0 | Status: SHIPPED | OUTPATIENT
Start: 2025-05-23 | End: 2025-05-28

## 2025-05-23 NOTE — TELEPHONE ENCOUNTER
Kimberly called because she needs a script for prednisone. She has been taking over the counter medication for the allergies but it has not been working. She said it has been going on for over a month now. She said she is not looking to schedule an appointment.

## 2025-05-26 ENCOUNTER — OFFICE VISIT (OUTPATIENT)
Dept: URGENT CARE | Facility: MEDICAL CENTER | Age: 75
End: 2025-05-26
Payer: COMMERCIAL

## 2025-05-26 VITALS
TEMPERATURE: 98.9 F | OXYGEN SATURATION: 95 % | DIASTOLIC BLOOD PRESSURE: 80 MMHG | RESPIRATION RATE: 18 BRPM | SYSTOLIC BLOOD PRESSURE: 126 MMHG | HEART RATE: 82 BPM

## 2025-05-26 DIAGNOSIS — J30.9 ALLERGIC RHINITIS, UNSPECIFIED SEASONALITY, UNSPECIFIED TRIGGER: ICD-10-CM

## 2025-05-26 DIAGNOSIS — R05.1 ACUTE COUGH: Primary | ICD-10-CM

## 2025-05-26 PROCEDURE — 99213 OFFICE O/P EST LOW 20 MIN: CPT | Performed by: PHYSICIAN ASSISTANT

## 2025-05-26 RX ORDER — BENZONATATE 100 MG/1
100 CAPSULE ORAL 3 TIMES DAILY PRN
Qty: 20 CAPSULE | Refills: 0 | Status: SHIPPED | OUTPATIENT
Start: 2025-05-26

## 2025-05-26 RX ORDER — MONTELUKAST SODIUM 10 MG/1
10 TABLET ORAL
Qty: 30 TABLET | Refills: 0 | Status: SHIPPED | OUTPATIENT
Start: 2025-05-26

## 2025-05-26 NOTE — PROGRESS NOTES
[facility]        NAME: Kimberly Shine is a 74 y.o. female  : 1950    MRN: [id number]  DATE: May 26, 2025  TIME: 9:57 AM    Assessment and Plan   Acute cough [R05.1]  1. Acute cough  montelukast (SINGULAIR) 10 mg tablet    benzonatate (TESSALON PERLES) 100 mg capsule      2. Allergic rhinitis, unspecified seasonality, unspecified trigger  montelukast (SINGULAIR) 10 mg tablet            Patient Instructions       Follow up with PCP in 3-5 days if no improvement.  I explained to the patient that I reviewed the notes and that if patient does not improve with the medication she should follow-up with her PCP.  I explained this is allergy related.    If tests have been performed at [facility], our office will contact you with results if changes need to be made to the care plan discussed with you at the visit.  You can review your full results on [facility].    Chief Complaint     Chief Complaint   Patient presents with    Cough     Pt has had scratchy throat and cough for about a month. Was given script for prednisone on Friday and has been using which has helped, but cough is persistent          History of Present Illness       Patient has history of seasonal allergies it increased when she went to [place].  She has tried Zyrtec, Xyzal, Claritin and Allegra without improvement.  She was started on prednisone 2 days ago and this has helped slightly.  No other complaints.    Cough  This is a new problem. The current episode started more than 1 month ago. The problem has been unchanged. The problem occurs every few minutes. The cough is Productive of sputum. Associated symptoms include ear congestion, nasal congestion, postnasal drip, rhinorrhea and a sore throat. Pertinent negatives include no chest pain, chills, ear pain, fever, headaches, heartburn, hemoptysis, myalgias, rash, shortness of breath, sweats, weight loss or wheezing. The symptoms are aggravated by lying down. Risk factors for lung  disease include travel. She has tried OTC cough suppressant, body position changes and oral steroids for the symptoms. The treatment provided mild relief. Her past medical history is significant for environmental allergies.       Review of Systems   Review of Systems   Constitutional:  Negative for chills, fever and weight loss.   HENT:  Positive for postnasal drip, rhinorrhea and sore throat. Negative for ear pain.    Respiratory:  Positive for cough. Negative for hemoptysis, shortness of breath and wheezing.    Cardiovascular:  Negative for chest pain.   Gastrointestinal:  Negative for heartburn.   Musculoskeletal:  Negative for myalgias.   Skin:  Negative for rash.   Allergic/Immunologic: Positive for environmental allergies.   Neurological:  Negative for headaches.   All other systems reviewed and are negative.        Current Medications     Current Medications[1]    Current Allergies     Allergies as of 05/26/2025 - Reviewed 05/26/2025   Allergen Reaction Noted    Pear - food allergy Other (See Comments) 02/16/2025    Pectin Other (See Comments) 02/16/2025    Prunus persica Other (See Comments) 02/16/2025    Rosuvastatin Myalgia 10/18/2024    Benzalkonium chloride Rash 09/14/2018    Metformin Headache 11/07/2024    Neomycin-bacitracin zn-polymyx Rash 07/18/2014    Other Hives 05/25/2018    Pollen extract Allergic Rhinitis 05/25/2018            The following portions of the patient's history were reviewed and updated as appropriate: allergies, current medications, past family history, past medical history, past social history, past surgical history and problem list.     Past Medical History[2]    Past Surgical History[3]    Family History[4]      Medications have been verified.        Objective   /80   Pulse 82   Temp 98.9 °F (37.2 °C)   Resp 18   SpO2 95%   No LMP recorded. Patient is postmenopausal.       Physical Exam     Physical Exam  Vitals and nursing note reviewed.   Constitutional:        Appearance: Normal appearance. She is normal weight.   HENT:      Right Ear: Ear canal and external ear normal.      Left Ear: Ear canal and external ear normal.      Nose: Congestion and rhinorrhea present.      Mouth/Throat:      Mouth: Mucous membranes are moist.      Pharynx: Posterior oropharyngeal erythema present. No oropharyngeal exudate.     Eyes:      Conjunctiva/sclera: Conjunctivae normal.       Cardiovascular:      Rate and Rhythm: Normal rate and regular rhythm.      Pulses: Normal pulses.      Heart sounds: Normal heart sounds.   Pulmonary:      Effort: Pulmonary effort is normal.      Breath sounds: Normal breath sounds.   Lymphadenopathy:      Cervical: No cervical adenopathy.     Neurological:      Mental Status: She is alert.     Psychiatric:         Mood and Affect: Mood normal.         Behavior: Behavior normal.         TMs bulging bilaterally.  Nasal mucosa boggy.               [1]   Current Outpatient Medications:     benzonatate (TESSALON PERLES) 100 mg capsule, Take 1 capsule (100 mg total) by mouth 3 (three) times a day as needed for cough, Disp: 20 capsule, Rfl: 0    montelukast (SINGULAIR) 10 mg tablet, Take 1 tablet (10 mg total) by mouth daily at bedtime, Disp: 30 tablet, Rfl: 0    aspirin 81 MG tablet, Take 81 mg by mouth daily  , Disp: , Rfl:     B Complex Vitamins (Vitamin B-Complex) TABS, Take by mouth, Disp: , Rfl:     Biotin 1 MG CAPS, Take by mouth, Disp: , Rfl:     Cholecalciferol 50 MCG (2000 UT) CAPS, Take 1 capsule by mouth daily, Disp: , Rfl:     Cyanocobalamin ER 1000 MCG TBCR, Take 1 tablet by mouth daily, Disp: , Rfl:     MAGNESIUM PO, Take by mouth daily, Disp: , Rfl:     metFORMIN (GLUCOPHAGE) 500 mg tablet, Take 1 tablet by mouth daily with breakfast, Disp: , Rfl:     Multiple Vitamin (MULTI-VITAMINS PO), Take 1 tablet by mouth daily, Disp: , Rfl:     nystatin (MYCOSTATIN) cream, Apply topically 2 (two) times a day for 7 days, Disp: 30 g, Rfl: 0    Omega-3 Fatty Acids  (FISH OIL MAXIMUM STRENGTH) 1200 MG CPDR, Take 2 capsules by mouth daily, Disp: , Rfl:     omeprazole (PriLOSEC) 20 mg delayed release capsule, TAKE 1 CAPSULE BY MOUTH DAILY TAKE 20 MINUTES PRIOR TO BREAKFAST DAILY FOR 30 DAYS THEN DISCONTINUE, Disp: 90 capsule, Rfl: 1    predniSONE 20 mg tablet, Take 1 tablet (20 mg total) by mouth daily for 5 days, Disp: 5 tablet, Rfl: 0    valACYclovir (VALTREX) 1,000 mg tablet, Take 1 tablet (1,000 mg total) by mouth 3 (three) times a day for 7 days, Disp: 21 tablet, Rfl: 0  [2]   Past Medical History:  Diagnosis Date    Cancer (HCC)     left breast, lumpectomy,with chemo and radiation    Hyperlipidemia    [3]   Past Surgical History:  Procedure Laterality Date    BREAST SURGERY Left     lumpectomy    CATARACT EXTRACTION      last assessed 3/21/16    COLONOSCOPY      NEUROPLASTY / TRANSPOSITION MEDIAN NERVE AT CARPAL TUNNEL      last assessed 3/21/16     TONSILLECTOMY      TOOTH EXTRACTION      last assessed 3/21/16   [4]   Family History  Problem Relation Name Age of Onset    Diabetes Paternal Aunt

## 2025-06-17 DIAGNOSIS — R05.1 ACUTE COUGH: ICD-10-CM

## 2025-06-17 DIAGNOSIS — J30.9 ALLERGIC RHINITIS, UNSPECIFIED SEASONALITY, UNSPECIFIED TRIGGER: ICD-10-CM

## 2025-06-21 RX ORDER — MONTELUKAST SODIUM 10 MG/1
10 TABLET ORAL
Qty: 90 TABLET | Refills: 1 | OUTPATIENT
Start: 2025-06-21